# Patient Record
Sex: FEMALE | Race: WHITE | Employment: OTHER | URBAN - METROPOLITAN AREA
[De-identification: names, ages, dates, MRNs, and addresses within clinical notes are randomized per-mention and may not be internally consistent; named-entity substitution may affect disease eponyms.]

---

## 2017-01-04 RX ORDER — METOPROLOL TARTRATE 50 MG/1
TABLET, FILM COATED ORAL
Qty: 180 TABLET | Refills: 1 | Status: SHIPPED | OUTPATIENT
Start: 2017-01-04 | End: 2017-03-27

## 2017-01-04 NOTE — TELEPHONE ENCOUNTER
Current Outpatient Prescriptions:              Metoprolol Tartrate 50 MG Oral Tab Take 1 tablet by mouth  twice a day with meals.  Disp: 180 tablet Rfl: 1

## 2017-02-23 ENCOUNTER — TELEPHONE (OUTPATIENT)
Dept: NEPHROLOGY | Facility: CLINIC | Age: 77
End: 2017-02-23

## 2017-02-23 ENCOUNTER — OFFICE VISIT (OUTPATIENT)
Dept: GASTROENTEROLOGY | Facility: CLINIC | Age: 77
End: 2017-02-23

## 2017-02-23 ENCOUNTER — TELEPHONE (OUTPATIENT)
Dept: GASTROENTEROLOGY | Facility: CLINIC | Age: 77
End: 2017-02-23

## 2017-02-23 VITALS
DIASTOLIC BLOOD PRESSURE: 72 MMHG | WEIGHT: 94.81 LBS | HEART RATE: 61 BPM | SYSTOLIC BLOOD PRESSURE: 172 MMHG | BODY MASS INDEX: 15.8 KG/M2 | HEIGHT: 65 IN

## 2017-02-23 DIAGNOSIS — Z01.89 ROUTINE LAB DRAW: Primary | ICD-10-CM

## 2017-02-23 DIAGNOSIS — E55.9 VITAMIN D DEFICIENCY: ICD-10-CM

## 2017-02-23 DIAGNOSIS — Z86.010 HISTORY OF COLON POLYPS: Primary | ICD-10-CM

## 2017-02-23 PROCEDURE — G0463 HOSPITAL OUTPT CLINIC VISIT: HCPCS | Performed by: INTERNAL MEDICINE

## 2017-02-23 PROCEDURE — 99213 OFFICE O/P EST LOW 20 MIN: CPT | Performed by: INTERNAL MEDICINE

## 2017-02-23 NOTE — TELEPHONE ENCOUNTER
Scheduled for:  Colonoscopy 38540  Date: Wed 3/29/17  Location: [facility]     Sedation:  IV  Time:  8:30 am  Prep: miralax  Meds/Allergies Reconciled?:  NKA  Diagnosis with codes:  Hx od colon polyps Z86.010  Was patient informed to call insurance with codes (Y/

## 2017-02-24 NOTE — TELEPHONE ENCOUNTER
Lab orders entered. Patient contacted and informed to fast 12 hours for lab work and she can do them a few days prior to her appointment with Dr. Luis Felipe Andres.

## 2017-02-24 NOTE — PROGRESS NOTES
HPI:    Patient ID: Criss Cee is a 67year old female. HPI  The patient returns in follow-up today along with her . She was last seen in March 2014. As per previous notes she underwent a screening colonoscopy in October 2013.   She was foun (six) hours as needed for Pain. Disp:  Rfl:    vitamin B-12 (CYANOCOBALAMIN) 50 MCG Oral Tab Take 500 mcg by mouth daily. Disp:  Rfl:    folic acid (FOLVITE) 237 MCG Oral Tab Take 400 mcg by mouth daily.  Disp:  Rfl:    Lactobacillus Rhamnosus, GG, ([facility] P Latest Ref Rng 8/15/2016   Glucose      70 - 99 mg/dL 93   Sodium      136 - 144 mmol/L 136   Potassium      3.3 - 5.1 mmol/L 4.1   Chloride      95 - 110 mmol/L 101   Carbon Dioxide, Total      22 - 32 mmol/L 28   BUN      8 - 20 mg/dL 12   CREATININE recommend at least 1 follow-up full colonoscopy at this point to exclude metachronous, advanced colon polyps. Rationale, nature and risks were discussed.   This will be arranged with a Gatorade/MiraLAX preparation which the patient tolerated well previousl

## 2017-03-06 ENCOUNTER — PRIOR ORIGINAL RECORDS (OUTPATIENT)
Dept: OTHER | Age: 77
End: 2017-03-06

## 2017-03-06 ENCOUNTER — LAB ENCOUNTER (OUTPATIENT)
Dept: LAB | Age: 77
End: 2017-03-06
Attending: INTERNAL MEDICINE
Payer: MEDICARE

## 2017-03-06 DIAGNOSIS — E55.9 VITAMIN D DEFICIENCY: ICD-10-CM

## 2017-03-06 DIAGNOSIS — Z01.89 ROUTINE LAB DRAW: ICD-10-CM

## 2017-03-06 LAB
ALBUMIN SERPL BCP-MCNC: 3.4 G/DL (ref 3.5–4.8)
ALBUMIN/GLOB SERPL: 0.8 {RATIO} (ref 1–2)
ALP SERPL-CCNC: 125 U/L (ref 32–100)
ALT SERPL-CCNC: 11 U/L (ref 14–54)
ANION GAP SERPL CALC-SCNC: 9 MMOL/L (ref 0–18)
AST SERPL-CCNC: 28 U/L (ref 15–41)
BASOPHILS # BLD: 0 K/UL (ref 0–0.2)
BASOPHILS NFR BLD: 1 %
BILIRUB SERPL-MCNC: 0.9 MG/DL (ref 0.3–1.2)
BILIRUB UR QL: NEGATIVE
BUN SERPL-MCNC: 10 MG/DL (ref 8–20)
BUN/CREAT SERPL: 14.3 (ref 10–20)
C3 SERPL-MCNC: 85 MG/DL (ref 88–201)
C4 SERPL-MCNC: 12 MG/DL (ref 18–55)
CALCIUM SERPL-MCNC: 9.2 MG/DL (ref 8.5–10.5)
CHLORIDE SERPL-SCNC: 97 MMOL/L (ref 95–110)
CHOLEST SERPL-MCNC: 188 MG/DL (ref 110–200)
CLARITY UR: CLEAR
CO2 SERPL-SCNC: 28 MMOL/L (ref 22–32)
COLOR UR: YELLOW
CREAT SERPL-MCNC: 0.7 MG/DL (ref 0.5–1.5)
CRP SERPL-MCNC: 0.6 MG/DL (ref 0–0.9)
EOSINOPHIL # BLD: 0.1 K/UL (ref 0–0.7)
EOSINOPHIL NFR BLD: 2 %
ERYTHROCYTE [DISTWIDTH] IN BLOOD BY AUTOMATED COUNT: 14.8 % (ref 11–15)
GLOBULIN PLAS-MCNC: 4.3 G/DL (ref 2.5–3.7)
GLUCOSE SERPL-MCNC: 87 MG/DL (ref 70–99)
GLUCOSE UR-MCNC: NEGATIVE MG/DL
HCT VFR BLD AUTO: 39.4 % (ref 35–48)
HDLC SERPL-MCNC: 85 MG/DL
HGB BLD-MCNC: 13.1 G/DL (ref 12–16)
HGB UR QL STRIP.AUTO: NEGATIVE
IRON SATN MFR SERPL: 16 % (ref 15–50)
IRON SERPL-MCNC: 54 MCG/DL (ref 28–170)
KETONES UR-MCNC: NEGATIVE MG/DL
LDLC SERPL CALC-MCNC: 87 MG/DL (ref 0–99)
LEUKOCYTE ESTERASE UR QL STRIP.AUTO: NEGATIVE
LYMPHOCYTES # BLD: 1.3 K/UL (ref 1–4)
LYMPHOCYTES NFR BLD: 45 %
MCH RBC QN AUTO: 28.3 PG (ref 27–32)
MCHC RBC AUTO-ENTMCNC: 33.3 G/DL (ref 32–37)
MCV RBC AUTO: 85 FL (ref 80–100)
MONOCYTES # BLD: 0.3 K/UL (ref 0–1)
MONOCYTES NFR BLD: 12 %
NEUTROPHILS # BLD AUTO: 1.1 K/UL (ref 1.8–7.7)
NEUTROPHILS NFR BLD: 41 %
NITRITE UR QL STRIP.AUTO: NEGATIVE
NONHDLC SERPL-MCNC: 103 MG/DL
OSMOLALITY UR CALC.SUM OF ELEC: 276 MOSM/KG (ref 275–295)
PH UR: 6 [PH] (ref 5–8)
PLATELET # BLD AUTO: 117 K/UL (ref 140–400)
PMV BLD AUTO: 8.3 FL (ref 7.4–10.3)
POTASSIUM SERPL-SCNC: 4.2 MMOL/L (ref 3.3–5.1)
PROT SERPL-MCNC: 7.7 G/DL (ref 5.9–8.4)
PROT UR-MCNC: NEGATIVE MG/DL
RBC # BLD AUTO: 4.63 M/UL (ref 3.7–5.4)
SODIUM SERPL-SCNC: 134 MMOL/L (ref 136–144)
SP GR UR STRIP: 1.01 (ref 1–1.03)
TIBC SERPL-MCNC: 339 MCG/DL (ref 228–428)
TRANSFERRIN SERPL-MCNC: 257 MG/DL (ref 192–382)
TRIGL SERPL-MCNC: 80 MG/DL (ref 1–149)
TSH SERPL-ACNC: 4.72 UIU/ML (ref 0.34–5.6)
UROBILINOGEN UR STRIP-ACNC: <2
VIT C UR-MCNC: NEGATIVE MG/DL
WBC # BLD AUTO: 2.8 K/UL (ref 4–11)

## 2017-03-06 PROCEDURE — 86160 COMPLEMENT ANTIGEN: CPT

## 2017-03-06 PROCEDURE — 81003 URINALYSIS AUTO W/O SCOPE: CPT

## 2017-03-06 PROCEDURE — 80053 COMPREHEN METABOLIC PANEL: CPT

## 2017-03-06 PROCEDURE — 83540 ASSAY OF IRON: CPT

## 2017-03-06 PROCEDURE — 84443 ASSAY THYROID STIM HORMONE: CPT

## 2017-03-06 PROCEDURE — 82306 VITAMIN D 25 HYDROXY: CPT

## 2017-03-06 PROCEDURE — 36415 COLL VENOUS BLD VENIPUNCTURE: CPT

## 2017-03-06 PROCEDURE — 86140 C-REACTIVE PROTEIN: CPT

## 2017-03-06 PROCEDURE — 84466 ASSAY OF TRANSFERRIN: CPT

## 2017-03-06 PROCEDURE — 80061 LIPID PANEL: CPT

## 2017-03-06 PROCEDURE — 85025 COMPLETE CBC W/AUTO DIFF WBC: CPT

## 2017-03-07 ENCOUNTER — OFFICE VISIT (OUTPATIENT)
Dept: RHEUMATOLOGY | Facility: CLINIC | Age: 77
End: 2017-03-07

## 2017-03-07 VITALS
SYSTOLIC BLOOD PRESSURE: 185 MMHG | HEART RATE: 59 BPM | WEIGHT: 93.81 LBS | HEIGHT: 65 IN | DIASTOLIC BLOOD PRESSURE: 82 MMHG | BODY MASS INDEX: 15.63 KG/M2

## 2017-03-07 DIAGNOSIS — M06.09 SERONEGATIVE RHEUMATOID ARTHRITIS OF MULTIPLE SITES (HCC): ICD-10-CM

## 2017-03-07 DIAGNOSIS — M35.01 SJOGREN'S SYNDROME WITH KERATOCONJUNCTIVITIS SICCA (HCC): Primary | ICD-10-CM

## 2017-03-07 PROCEDURE — 99213 OFFICE O/P EST LOW 20 MIN: CPT | Performed by: INTERNAL MEDICINE

## 2017-03-07 PROCEDURE — G0463 HOSPITAL OUTPT CLINIC VISIT: HCPCS | Performed by: INTERNAL MEDICINE

## 2017-03-07 NOTE — PROGRESS NOTES
HPI:    Patient ID: Kavon Huerta is a 67year old female. HPI Comments: Chikis Marquez is a 67year old woman with autoimmune disorder. She continues to feel that she's been doing well.  She had severe joint destruction in the past, but denies increased swellin acetaminophen (TYLENOL) 325 MG Oral Tab Take 325 mg by mouth every 6 (six) hours as needed for Pain. Disp:  Rfl:    vitamin B-12 (CYANOCOBALAMIN) 50 MCG Oral Tab Take 500 mcg by mouth daily.    Disp:  Rfl:    folic acid (FOLVITE) 141 MCG Oral Tab Take 400 elevated C-reactive protein, polyclonal gammopathy. All is stable. Her leukopenia and thrombocytopenia will be followed closely. There isn't an indication for immunosuppressives at this point. Labs will be done again in 6 months and include an SPEP.  I will

## 2017-03-08 LAB — 25(OH)D3 SERPL-MCNC: 48.9 NG/ML

## 2017-03-09 LAB
ALBUMIN: 3.4 G/DL
ALT (SGPT): 11 U/L
ALT (SGPT): 11 U/L
AST (SGOT): 28 U/L
AST (SGOT): 28 U/L
BILIRUBIN TOTAL: 0.9 MG/DL
BILIRUBIN TOTAL: 0.9 MG/DL
BUN: 10 MG/DL
CALCIUM: 9.2 MG/DL
CHLORIDE: 97 MEQ/L
CHOLESTEROL, TOTAL: 188 MG/DL
CHOLESTEROL, TOTAL: 188 MG/DL
CREATININE, SERUM: 0.7 MG/DL
GLOBULIN: 4.3 G/DL
GLUCOSE: 87 MG/DL
HDL CHOLESTEROL: 85 MG/DL
HDL CHOLESTEROL: 85 MG/DL
HEMATOCRIT: 39.4 %
HEMOGLOBIN: 13.1 G/DL
LDL CHOLESTEROL: 87 MG/DL
LDL CHOLESTEROL: 87 MG/DL
MCH: 28.3 PG
MCHC: 33.3 G/DL
MCV: 85 FL
NON-HDL CHOLESTEROL: 103 MG/DL
NON-HDL CHOLESTEROL: 103 MG/DL
PLATELETS: 117 K/UL
POTASSIUM, SERUM: 4.2 MEQ/L
PROTEIN, TOTAL: 7.7 G/DL
RED BLOOD COUNT: 4.63 X 10-6/U
SGOT (AST): 28 IU/L
SGPT (ALT): 11 IU/L
SODIUM: 134 MEQ/L
THYROID STIMULATING HORMONE: 4.72 MLU/L
TRIGLYCERIDES: 80 MG/DL
TRIGLYCERIDES: 80 MG/DL
WHITE BLOOD COUNT: 2.8 X 10-3/U

## 2017-03-14 ENCOUNTER — PRIOR ORIGINAL RECORDS (OUTPATIENT)
Dept: OTHER | Age: 77
End: 2017-03-14

## 2017-03-20 ENCOUNTER — OFFICE VISIT (OUTPATIENT)
Dept: NEPHROLOGY | Facility: CLINIC | Age: 77
End: 2017-03-20

## 2017-03-20 VITALS
BODY MASS INDEX: 15.85 KG/M2 | DIASTOLIC BLOOD PRESSURE: 74 MMHG | SYSTOLIC BLOOD PRESSURE: 193 MMHG | WEIGHT: 94 LBS | HEART RATE: 66 BPM | HEIGHT: 64.5 IN

## 2017-03-20 DIAGNOSIS — I10 ESSENTIAL HYPERTENSION: ICD-10-CM

## 2017-03-20 DIAGNOSIS — I01.1 RHEUMATOID AORTITIS: Primary | ICD-10-CM

## 2017-03-20 DIAGNOSIS — E78.9 DISORDER OF LIPID METABOLISM: ICD-10-CM

## 2017-03-20 PROCEDURE — G0463 HOSPITAL OUTPT CLINIC VISIT: HCPCS | Performed by: INTERNAL MEDICINE

## 2017-03-20 PROCEDURE — 99214 OFFICE O/P EST MOD 30 MIN: CPT | Performed by: INTERNAL MEDICINE

## 2017-03-20 NOTE — PATIENT INSTRUCTIONS
1. Janet Crew   good job with everything     good luck with colonoscopy     see me September after labs done. Arielle Mtz

## 2017-03-24 NOTE — PROGRESS NOTES
NEPHROLOGY PROGRESS NOTE  Ani Lau     MRN:  [id number]   Date of Service:  3/20/17     HISTORY OF PRESENT ILLNESS: The patient is here. She is doing fine. She has rheumatoid arthritis, which she manages well at home. She has no specific complaints.  Pre about 6 months.      Dictated portions dictated on 3/23/17

## 2017-03-27 RX ORDER — HYDROXYCHLOROQUINE SULFATE 200 MG/1
200 TABLET, FILM COATED ORAL 2 TIMES DAILY
Qty: 180 TABLET | Refills: 3 | Status: SHIPPED | OUTPATIENT
Start: 2017-03-27 | End: 2018-01-11

## 2017-03-27 RX ORDER — METOPROLOL TARTRATE 50 MG/1
TABLET, FILM COATED ORAL
Qty: 180 TABLET | Refills: 1 | Status: ON HOLD | OUTPATIENT
Start: 2017-03-27 | End: 2017-05-19

## 2017-03-27 NOTE — TELEPHONE ENCOUNTER
From: Romy Fan  To: Verna Badillo MD  Sent: 3/26/2017 7:06 AM CDT  Subject: Medication Renewal Request    Original authorizing provider: Michael Torrez.  Rochelle Lara would like a refill of the following medications:  Metoprolol Tartrate

## 2017-03-27 NOTE — TELEPHONE ENCOUNTER
From: Samir Hardy  To: Molly Cooley MD  Sent: 3/26/2017 7:06 AM CDT  Subject: Medication Renewal Request    Original authorizing provider: MD Samir Thurman would like a refill of the following medications:  Hydroxychloroqui

## 2017-03-29 ENCOUNTER — SURGERY (OUTPATIENT)
Age: 77
End: 2017-03-29

## 2017-03-29 ENCOUNTER — HOSPITAL ENCOUNTER (OUTPATIENT)
Facility: HOSPITAL | Age: 77
Setting detail: HOSPITAL OUTPATIENT SURGERY
Discharge: HOME | End: 2017-03-29
Attending: INTERNAL MEDICINE | Admitting: INTERNAL MEDICINE
Payer: MEDICARE

## 2017-03-29 VITALS
HEART RATE: 65 BPM | RESPIRATION RATE: 19 BRPM | SYSTOLIC BLOOD PRESSURE: 153 MMHG | OXYGEN SATURATION: 96 % | BODY MASS INDEX: 16.05 KG/M2 | WEIGHT: 94 LBS | HEIGHT: 64 IN | DIASTOLIC BLOOD PRESSURE: 86 MMHG

## 2017-03-29 PROCEDURE — 45385 COLONOSCOPY W/LESION REMOVAL: CPT | Performed by: INTERNAL MEDICINE

## 2017-03-29 PROCEDURE — 0DBK8ZX EXCISION OF ASCENDING COLON, VIA NATURAL OR ARTIFICIAL OPENING ENDOSCOPIC, DIAGNOSTIC: ICD-10-PCS | Performed by: INTERNAL MEDICINE

## 2017-03-29 PROCEDURE — 45381 COLONOSCOPY SUBMUCOUS NJX: CPT | Performed by: INTERNAL MEDICINE

## 2017-03-29 PROCEDURE — 99153 MOD SED SAME PHYS/QHP EA: CPT | Performed by: INTERNAL MEDICINE

## 2017-03-29 PROCEDURE — 0DBL8ZX EXCISION OF TRANSVERSE COLON, VIA NATURAL OR ARTIFICIAL OPENING ENDOSCOPIC, DIAGNOSTIC: ICD-10-PCS | Performed by: INTERNAL MEDICINE

## 2017-03-29 PROCEDURE — 99152 MOD SED SAME PHYS/QHP 5/>YRS: CPT | Performed by: INTERNAL MEDICINE

## 2017-03-29 PROCEDURE — 3E0H8GC INTRODUCTION OF OTHER THERAPEUTIC SUBSTANCE INTO LOWER GI, VIA NATURAL OR ARTIFICIAL OPENING ENDOSCOPIC: ICD-10-PCS | Performed by: INTERNAL MEDICINE

## 2017-03-29 RX ORDER — SODIUM CHLORIDE 0.9 % (FLUSH) 0.9 %
10 SYRINGE (ML) INJECTION AS NEEDED
Status: DISCONTINUED | OUTPATIENT
Start: 2017-03-29 | End: 2017-03-29

## 2017-03-29 RX ORDER — SODIUM CHLORIDE, SODIUM LACTATE, POTASSIUM CHLORIDE, CALCIUM CHLORIDE 600; 310; 30; 20 MG/100ML; MG/100ML; MG/100ML; MG/100ML
INJECTION, SOLUTION INTRAVENOUS CONTINUOUS
Status: DISCONTINUED | OUTPATIENT
Start: 2017-03-29 | End: 2017-03-29

## 2017-03-29 RX ORDER — MIDAZOLAM HYDROCHLORIDE 1 MG/ML
INJECTION INTRAMUSCULAR; INTRAVENOUS
Status: DISCONTINUED | OUTPATIENT
Start: 2017-03-29 | End: 2017-03-29

## 2017-03-29 RX ORDER — MIDAZOLAM HYDROCHLORIDE 1 MG/ML
1 INJECTION INTRAMUSCULAR; INTRAVENOUS EVERY 5 MIN PRN
Status: DISCONTINUED | OUTPATIENT
Start: 2017-03-29 | End: 2017-03-29

## 2017-03-29 NOTE — H&P
History & Physical Examination    Patient Name: Mike Hall  MRN: [id number]  [facility]: [id number]  YOB: 1940    Diagnosis: Personal history of adenomatous colon polyps        Prescriptions prior to admission:  Metoprolol Tartrate 50 MG Oral Ta Allergies: No Known Allergies    Past Medical History   Diagnosis Date   • Rheumatoid arthritis ([facility] [address]    • Unspecified essential hypertension    • Dental examination 1998 11/00   • Encounter for eye exam    • Blood in stool 1995   • Periodic heal

## 2017-03-29 NOTE — OPERATIVE REPORT
[facility] Endoscopy Report      Date of Procedure:  03/29/2017      Preoperative Diagnosis:  Personal history of adenomatous colon polyps      Postoperative Diagnosis:  1. Multiple colon polyps  2.   Sigmoid colon diverticulosis      Proce polyp to elevated off the bowel wall. The polyp was excised using snare cautery in a piecemeal fashion with fragments retrieved via suction. #5 Resolution clips were utilized to close the corresponding mucosal defect.   A carbon-based tattoo was applied p

## 2017-05-15 ENCOUNTER — HOSPITAL ENCOUNTER (INPATIENT)
Facility: HOSPITAL | Age: 77
LOS: 4 days | Discharge: HOME | DRG: 274 | End: 2017-05-19
Admitting: HOSPITALIST
Payer: MEDICARE

## 2017-05-15 ENCOUNTER — APPOINTMENT (OUTPATIENT)
Dept: GENERAL RADIOLOGY | Facility: HOSPITAL | Age: 77
DRG: 274 | End: 2017-05-15
Payer: MEDICARE

## 2017-05-15 ENCOUNTER — TELEPHONE (OUTPATIENT)
Dept: NEPHROLOGY | Facility: CLINIC | Age: 77
End: 2017-05-15

## 2017-05-15 ENCOUNTER — APPOINTMENT (OUTPATIENT)
Dept: GENERAL RADIOLOGY | Facility: HOSPITAL | Age: 77
DRG: 274 | End: 2017-05-15
Attending: EMERGENCY MEDICINE
Payer: MEDICARE

## 2017-05-15 DIAGNOSIS — S70.02XA HEMATOMA OF HIP, LEFT, INITIAL ENCOUNTER: ICD-10-CM

## 2017-05-15 DIAGNOSIS — I49.9 CARDIAC ARRHYTHMIA, UNSPECIFIED CARDIAC ARRHYTHMIA TYPE: Primary | ICD-10-CM

## 2017-05-15 PROCEDURE — 73552 X-RAY EXAM OF FEMUR 2/>: CPT | Performed by: EMERGENCY MEDICINE

## 2017-05-15 PROCEDURE — 72170 X-RAY EXAM OF PELVIS: CPT | Performed by: EMERGENCY MEDICINE

## 2017-05-15 PROCEDURE — 99220 INITIAL OBSERVATION CARE,LEVL III: CPT | Performed by: HOSPITALIST

## 2017-05-15 PROCEDURE — 73502 X-RAY EXAM HIP UNI 2-3 VIEWS: CPT

## 2017-05-15 RX ORDER — ACETAMINOPHEN 325 MG/1
325 TABLET ORAL EVERY 6 HOURS PRN
Status: DISCONTINUED | OUTPATIENT
Start: 2017-05-15 | End: 2017-05-15

## 2017-05-15 RX ORDER — MORPHINE SULFATE 2 MG/ML
2 INJECTION, SOLUTION INTRAMUSCULAR; INTRAVENOUS EVERY 2 HOUR PRN
Status: DISCONTINUED | OUTPATIENT
Start: 2017-05-15 | End: 2017-05-19

## 2017-05-15 RX ORDER — MELATONIN
400 DAILY
Status: DISCONTINUED | OUTPATIENT
Start: 2017-05-15 | End: 2017-05-19

## 2017-05-15 RX ORDER — MORPHINE SULFATE 2 MG/ML
1 INJECTION, SOLUTION INTRAMUSCULAR; INTRAVENOUS EVERY 2 HOUR PRN
Status: DISCONTINUED | OUTPATIENT
Start: 2017-05-15 | End: 2017-05-19

## 2017-05-15 RX ORDER — METOPROLOL TARTRATE 50 MG/1
50 TABLET, FILM COATED ORAL
Status: DISCONTINUED | OUTPATIENT
Start: 2017-05-15 | End: 2017-05-15

## 2017-05-15 RX ORDER — ONDANSETRON 2 MG/ML
4 INJECTION INTRAMUSCULAR; INTRAVENOUS EVERY 6 HOURS PRN
Status: DISCONTINUED | OUTPATIENT
Start: 2017-05-15 | End: 2017-05-19

## 2017-05-15 RX ORDER — OMEGA-3/DHA/EPA/FISH OIL 60 MG-90MG
1 CAPSULE ORAL
Status: ON HOLD | COMMUNITY
End: 2017-05-19

## 2017-05-15 RX ORDER — HYOSCYAMINE SULFATE 0.125 MG
0.12 TABLET,DISINTEGRATING ORAL EVERY 6 HOURS PRN
Status: DISCONTINUED | OUTPATIENT
Start: 2017-05-15 | End: 2017-05-19

## 2017-05-15 RX ORDER — DOCUSATE SODIUM 100 MG/1
100 CAPSULE, LIQUID FILLED ORAL 2 TIMES DAILY
Status: DISCONTINUED | OUTPATIENT
Start: 2017-05-15 | End: 2017-05-19

## 2017-05-15 RX ORDER — SODIUM PHOSPHATE, DIBASIC AND SODIUM PHOSPHATE, MONOBASIC 7; 19 G/133ML; G/133ML
1 ENEMA RECTAL ONCE AS NEEDED
Status: DISCONTINUED | OUTPATIENT
Start: 2017-05-15 | End: 2017-05-19

## 2017-05-15 RX ORDER — HEPARIN SODIUM 5000 [USP'U]/ML
5000 INJECTION, SOLUTION INTRAVENOUS; SUBCUTANEOUS
Status: COMPLETED | OUTPATIENT
Start: 2017-05-15 | End: 2017-05-15

## 2017-05-15 RX ORDER — HEPARIN SODIUM 5000 [USP'U]/ML
5000 INJECTION, SOLUTION INTRAVENOUS; SUBCUTANEOUS EVERY 12 HOURS
Status: DISCONTINUED | OUTPATIENT
Start: 2017-05-15 | End: 2017-05-15

## 2017-05-15 RX ORDER — POLYETHYLENE GLYCOL 3350 17 G/17G
17 POWDER, FOR SOLUTION ORAL DAILY PRN
Status: DISCONTINUED | OUTPATIENT
Start: 2017-05-15 | End: 2017-05-19

## 2017-05-15 RX ORDER — BISACODYL 10 MG
10 SUPPOSITORY, RECTAL RECTAL
Status: DISCONTINUED | OUTPATIENT
Start: 2017-05-15 | End: 2017-05-19

## 2017-05-15 RX ORDER — HYDROXYCHLOROQUINE SULFATE 200 MG/1
200 TABLET, FILM COATED ORAL 2 TIMES DAILY
Status: DISCONTINUED | OUTPATIENT
Start: 2017-05-15 | End: 2017-05-19

## 2017-05-15 RX ORDER — MORPHINE SULFATE 2 MG/ML
2 INJECTION, SOLUTION INTRAMUSCULAR; INTRAVENOUS ONCE
Status: DISCONTINUED | OUTPATIENT
Start: 2017-05-15 | End: 2017-05-19

## 2017-05-15 RX ORDER — MORPHINE SULFATE 2 MG/ML
2 INJECTION, SOLUTION INTRAMUSCULAR; INTRAVENOUS ONCE
Status: COMPLETED | OUTPATIENT
Start: 2017-05-15 | End: 2017-05-15

## 2017-05-15 RX ORDER — ACETAMINOPHEN 325 MG/1
650 TABLET ORAL EVERY 6 HOURS PRN
Status: DISCONTINUED | OUTPATIENT
Start: 2017-05-15 | End: 2017-05-19

## 2017-05-15 RX ORDER — MORPHINE SULFATE 4 MG/ML
4 INJECTION, SOLUTION INTRAMUSCULAR; INTRAVENOUS EVERY 2 HOUR PRN
Status: DISCONTINUED | OUTPATIENT
Start: 2017-05-15 | End: 2017-05-19

## 2017-05-15 RX ORDER — SACCHAROMYCES BOULARDII 250 MG
250 CAPSULE ORAL 2 TIMES DAILY WITH MEALS
Status: DISCONTINUED | OUTPATIENT
Start: 2017-05-15 | End: 2017-05-19

## 2017-05-15 RX ORDER — 0.9 % SODIUM CHLORIDE 0.9 %
VIAL (ML) INJECTION
Status: COMPLETED
Start: 2017-05-15 | End: 2017-05-15

## 2017-05-15 RX ORDER — DOXEPIN HYDROCHLORIDE 50 MG/1
1 CAPSULE ORAL
Status: DISCONTINUED | OUTPATIENT
Start: 2017-05-15 | End: 2017-05-19

## 2017-05-15 NOTE — HISTORICAL OFFICE NOTE
Thomas Laura  : 1940  ACCOUNT:  [id number]  [id number]  PCP: Dr. Ghulam Eduardo     TODAY'S DATE: 2017  DICTATED BY:  Tonya Gamez M.D.]    CHIEF COMPLAINT: [Followup of Atrial fibrillation, currently SR.]    HPI:  [On 2017, Emmanuelle Link, EYES: conjunctivae not injected and no xanthelasma. ENT: mucosa pink and moist. NECK: jugular venous pressure not elevated. RESP: respirations with normal rate and rhythm, clear to auscultation. GI: normal bowel sounds. MS: normal gait.  EXT: no clubbing or daily                                    09/19/12 Vitamin B-12          500MCG    1 daily                                  09/19/12 Vitamin C             500MG     1 daily                                  09/18/12 Hydroxychloroquine [id number]     1 twice da

## 2017-05-15 NOTE — ED PROVIDER NOTES
Patient Seen in: [facility] Emergency Department    History   Patient presents with:  Fall (musculoskeletal, neurologic)    Stated Complaint:     HPI    65year old female with a past medical history of osteoporosis, rheumatoid arthritis, breast c Metoprolol Tartrate 50 MG Oral Tab,  Take 1 tablet by mouth  twice a day with meals. Hydroxychloroquine Sulfate 200 MG Oral Tab,  Take 1 tablet (200 mg total) by mouth 2 (two) times daily.    Cholecalciferol (VITAMIN D3) 2000 UNITS Oral Tab,  Take 2,000 Current:/87 mmHg  Pulse 122  Temp(Src) 98 °F (36.7 °C)  Resp 18  Ht 165.1 cm (5' 5\")  Wt 42.094 kg  BMI 15.44 kg/m2  SpO2 100%        Physical Exam   Constitutional: She is oriented to person, place, and time. She appears well-developed.  She appears The following orders were created for panel order CBC WITH DIFFERENTIAL WITH PLATELET.   Procedure                               Abnormality         Status                     ---------                               -----------         ------ Present on Admission  Date Reviewed: 3/23/2017          ICD-10-CM Noted POA    * (Principal)Cardiac arrhythmia, unspecified cardiac arrhythmia type I49.9 5/15/2017 Unknown    Cardiac arrhythmia I49.9 5/15/2017 Unknown    Hematoma of hip, left, initial enco

## 2017-05-15 NOTE — TELEPHONE ENCOUNTER
Spoke to patient's daughter Walker Thomas and I was informed that patient is in the ER and possibly has a broken hip. They are taking additional xrays right now. [facility] aware.

## 2017-05-15 NOTE — ED INITIAL ASSESSMENT (HPI)
Patient states she fell x3 days ago, complains of L hip pain, states it hurts to ambulate, patient denies hitting head/loc/chest pain/tab

## 2017-05-15 NOTE — H&P
[facility]    PATIENT'S NAME: Elda Reji   ATTENDING PHYSICIAN: Senia Landa MD   PATIENT ACCOUNT#:   [id number]    LOCATION:  [place] [address]  MEDICAL RECORD #:   [id number]       YOB: 1940  ADMISSION DATE:       05/15/20 to stand up, but she has been having difficulty with her gait. Also, she noted ecchymosis and bruise on the lateral aspect of the left proximal thigh. Other 12-point review of systems negative.       PHYSICAL EXAMINATION:    GENERAL:  Alert and oriented t

## 2017-05-15 NOTE — CONSULTS
[facility] HOSP - [facility]    Report of Cardiology Consultation    Verba Law Patient Status:  Emergency    3/21/1940 MRN [id number]   Location [address] Attending Kristel Acosta MD   [facility] Day # 0  Brooklynn Villa, Past Surgical History      Past Surgical History    FOOT SURGERY      CHOLECYSTECTOMY  2012    ELECTROCARDIOGRAM, COMPLETE  08-    Comment SCANNED TO MEDIA TAB    MASTECTOMY RIGHT  2009    COLONOSCOPY  2013    COLONOSCOPY N/A 3/29/2017    Com CREATSERUM 0.61 05/15/2017   BUN 14 05/15/2017   * 05/15/2017   K 4.0 05/15/2017   CL 96 05/15/2017   CO2 22 05/15/2017   * 05/15/2017   CA 8.8 05/15/2017   ALB 3.4* 03/06/2017   ALKPHO 125* 03/06/2017   TP 7.7 03/06/2017   AST 28 03/06/2017

## 2017-05-16 ENCOUNTER — APPOINTMENT (OUTPATIENT)
Dept: CV DIAGNOSTICS | Facility: HOSPITAL | Age: 77
DRG: 274 | End: 2017-05-16
Attending: INTERNAL MEDICINE
Payer: MEDICARE

## 2017-05-16 ENCOUNTER — APPOINTMENT (OUTPATIENT)
Dept: GENERAL RADIOLOGY | Facility: HOSPITAL | Age: 77
DRG: 274 | End: 2017-05-16
Attending: HOSPITALIST
Payer: MEDICARE

## 2017-05-16 PROCEDURE — 99233 SBSQ HOSP IP/OBS HIGH 50: CPT | Performed by: HOSPITALIST

## 2017-05-16 PROCEDURE — 71010 XR CHEST AP PORTABLE  (CPT=71010): CPT | Performed by: HOSPITALIST

## 2017-05-16 PROCEDURE — B24BYZZ ULTRASONOGRAPHY OF HEART WITH AORTA USING OTHER CONTRAST: ICD-10-PCS | Performed by: INTERNAL MEDICINE

## 2017-05-16 PROCEDURE — 93306 TTE W/DOPPLER COMPLETE: CPT | Performed by: INTERNAL MEDICINE

## 2017-05-16 RX ORDER — METOPROLOL TARTRATE 50 MG/1
100 TABLET, FILM COATED ORAL
Status: DISCONTINUED | OUTPATIENT
Start: 2017-05-16 | End: 2017-05-19

## 2017-05-16 RX ORDER — MAGNESIUM OXIDE 400 MG (241.3 MG MAGNESIUM) TABLET
800 TABLET ONCE
Status: COMPLETED | OUTPATIENT
Start: 2017-05-16 | End: 2017-05-16

## 2017-05-16 NOTE — PHYSICAL THERAPY NOTE
PHYSICAL THERAPY QUICK EVALUATION - INPATIENT    Room Number: 969/376-M  Evaluation Date: 5/16/2017  Presenting Problem: cardiac arrhythmia, recent fall with hip pain  Physician Order: PT Eval and Treat    Problem List  Principal Problem:    Cardiac arrhyt functional limits     Lower extremity ROM is within functional limits    Lower extremity strength is within functional limits except for the following:  left hip 3/5, not resisted secondary to pain.     NEUROLOGICAL FINDINGS         héctor le intact PT, rn aware of above. Pt's family aware as well. PT Discharge Recommendations: Home    PLAN  Patient has been evaluated and presents with no skilled Physical Therapy needs at this time. Patient discharged from Physical Therapy services.   Please re-ord

## 2017-05-16 NOTE — PROGRESS NOTES
[facility]  Hospitalist Progress  Note     Lambert Proper Patient Status:  Inpatient    3/21/1940  65year old CSN [id number]   Location 706/855-H Attending Dulce Byrnes MD   Hosp Day # 1  Brooklynn Bruce MD     ASSESSMENT/PLAN HGB  14.3  12.0   HCT  42.6  35.3   MCV  84.1  83.1   MCH  28.2  28.3   MCHC  33.6  34.0   RDW  14.7  14.7   WBC  4.3  3.9*   PLT  134*  109*     Recent Labs   Lab  05/15/17   1436  05/16/17   0602   GLU  104*  90   BUN  14  8   CREATSERUM  0.61  0.54

## 2017-05-16 NOTE — PROGRESS NOTES
[facility] Cardiology   Progress Note    Taiwo Strickland Patient Status:  Observation    3/21/1940 MRN [id number]   Location [facility] Attending Osito Loza MD   Hosp Day # 1  Brooklynn Mary MD     1) P 250 mg Oral BID with meals     Pulmonary: clear to auscultation bilaterally, TRA diminished  Cardiovascular: S1, S2 normal, irregular  Abdominal: soft, non-tender  Extremities: no edema  Neurologic: Grossly normal  Tele: Aflutter with -130, 120-130 a deformity of indeterminate age. 3. Osteitis pubis. 4. Atherosclerosis. 5. Soft tissue swelling. 6. Postop changes in the abdomen and pelvis. Xr Hip W Or Wo Pelvis 2 Or 3 Views, Left (cpt=73502)    5/15/2017  CONCLUSION:  1. Demineralization.  2. L4 co

## 2017-05-16 NOTE — PLAN OF CARE
Impaired Activities of Daily Living    • Achieve highest/safest level of independence in self care Not Progressing        Impaired Functional Mobility    • Achieve highest/safest level of mobility/gait Not Progressing          DISCHARGE PLANNING    • Disch

## 2017-05-17 ENCOUNTER — APPOINTMENT (OUTPATIENT)
Dept: GENERAL RADIOLOGY | Facility: HOSPITAL | Age: 77
DRG: 274 | End: 2017-05-17
Attending: HOSPITALIST
Payer: MEDICARE

## 2017-05-17 PROCEDURE — 99233 SBSQ HOSP IP/OBS HIGH 50: CPT | Performed by: HOSPITALIST

## 2017-05-17 PROCEDURE — 71010 XR CHEST AP PORTABLE  (CPT=71010): CPT | Performed by: HOSPITALIST

## 2017-05-17 RX ORDER — MAGNESIUM OXIDE 400 MG (241.3 MG MAGNESIUM) TABLET
400 TABLET 2 TIMES DAILY
Status: DISCONTINUED | OUTPATIENT
Start: 2017-05-17 | End: 2017-05-19

## 2017-05-17 RX ORDER — MAGNESIUM OXIDE 400 MG (241.3 MG MAGNESIUM) TABLET
400 TABLET ONCE
Status: COMPLETED | OUTPATIENT
Start: 2017-05-17 | End: 2017-05-17

## 2017-05-17 NOTE — OCCUPATIONAL THERAPY NOTE
OCCUPATIONAL THERAPY EVALUATION - INPATIENT     Room Number: 416/675-Z  Evaluation Date: 5/17/2017  Type of Evaluation: Initial       Physician Order: IP Consult to Occupational Therapy  Reason for Therapy: ADL/IADL Dysfunction and Discharge Planning    OC w/ ADLS)       PLAN  OT Treatment Plan: ADL training;Functional transfer training; Endurance training;Patient/Family education;Patient/Family training (goals  5-)         OCCUPATIONAL THERAPY MEDICAL/SOCIAL HISTORY     Problem List  Principal Proble to make needs known    Behavioral/Emotional/Social: pleasant and cooperative    RANGE OF MOTION   Upper extremity ROM is within functional limits, Pt has significant joint deformities B hands, is L dominant    STRENGTH ASSESSMENT  Upper extremity strength Comment:    Patient will complete min assist  Comment:                Goals  on: 17  Frequency: 3-5x/week

## 2017-05-17 NOTE — DIETARY NOTE
ADULT NUTRITION INITIAL ASSESSMENT    Pt is at moderate nutrition risk. Pt meets malnutrition criteria.       CRITERIA FOR MALNUTRITION DIAGNOSIS:  Criteria for severe malnutrition diagnosis: chronic illness related to energy intake less than 75% for great Pt lives at home with . ADMITTING DIAGNOSIS: Cardiac arrhythmia    PERTINENT PAST MEDICAL HISTORY: RA, Breast CA-mastectomy, Choly, foot surgery, others noted.      ANTHROPOMETRICS:  HT: 165.1 cm (5' 5\")    WT: 40.96 kg (90 lb 4.8 oz)  BMI: Bod of supplement intake and tolerance of supplement intake.   - Anthropometric Measurement:      Monitor: wt  - Nutrition Goals:      halt wt loss, gradual wt gain as able, PO and supplement greater than 75% of needs, intake to meet needs and labs WNL    DIETI

## 2017-05-17 NOTE — CM/SW NOTE
Met with patient at bedside. Explanation of of BPCI/Medicare program provided. Patient was enrolled under . Patient/family agreed to phone f/u for 3 months from [facility] after discharge from [address] [place] [address]. BPCI/Medicare letter and brochure provided.

## 2017-05-17 NOTE — PROGRESS NOTES
[facility]    Progress Note    Macon Grad Patient Status:  Inpatient    3/21/1940 MRN [id number]   Location [facility] Attending Gary Zurita MD   Hosp Day # 2  Brooklynn Avila MD       Assessment and Plan: docusate sodium  100 mg Oral BID   • rivaroxaban  15 mg Oral Daily with food   • Cholecalciferol  2,000 Units Oral Daily   • multivitamin  1 tablet Oral Daily   • folic acid  719 mcg Oral Daily   • Hydroxychloroquine Sulfate  200 mg Oral BID   • ngocy atelectasis. Follow up study is recommended to exclude developing consolidation/pneumonia. 3. Call report         Xr Hip W Or Wo Pelvis 2 Or 3 Views, Left (cpt=73502)    5/15/2017  CONCLUSION:  1. Demineralization.  2. L4 compression deformity of indetermin

## 2017-05-18 ENCOUNTER — APPOINTMENT (OUTPATIENT)
Dept: CV DIAGNOSTICS | Facility: HOSPITAL | Age: 77
DRG: 274 | End: 2017-05-18
Attending: INTERNAL MEDICINE
Payer: MEDICARE

## 2017-05-18 ENCOUNTER — APPOINTMENT (OUTPATIENT)
Dept: INTERVENTIONAL RADIOLOGY/VASCULAR | Facility: HOSPITAL | Age: 77
DRG: 274 | End: 2017-05-18
Attending: INTERNAL MEDICINE
Payer: MEDICARE

## 2017-05-18 PROCEDURE — 4A023FZ MEASUREMENT OF CARDIAC RHYTHM, PERCUTANEOUS APPROACH: ICD-10-PCS | Performed by: INTERNAL MEDICINE

## 2017-05-18 PROCEDURE — 4A0234Z MEASUREMENT OF CARDIAC ELECTRICAL ACTIVITY, PERCUTANEOUS APPROACH: ICD-10-PCS | Performed by: INTERNAL MEDICINE

## 2017-05-18 PROCEDURE — 93325 DOPPLER ECHO COLOR FLOW MAPG: CPT | Performed by: INTERNAL MEDICINE

## 2017-05-18 PROCEDURE — 93320 DOPPLER ECHO COMPLETE: CPT | Performed by: INTERNAL MEDICINE

## 2017-05-18 PROCEDURE — 99233 SBSQ HOSP IP/OBS HIGH 50: CPT | Performed by: HOSPITALIST

## 2017-05-18 PROCEDURE — 02K83ZZ MAP CONDUCTION MECHANISM, PERCUTANEOUS APPROACH: ICD-10-PCS | Performed by: INTERNAL MEDICINE

## 2017-05-18 RX ORDER — TRAMADOL HYDROCHLORIDE 50 MG/1
100 TABLET ORAL EVERY 6 HOURS PRN
Status: DISCONTINUED | OUTPATIENT
Start: 2017-05-18 | End: 2017-05-19

## 2017-05-18 RX ORDER — SODIUM CHLORIDE 9 MG/ML
INJECTION, SOLUTION INTRAVENOUS
Status: DISPENSED
Start: 2017-05-18 | End: 2017-05-18

## 2017-05-18 RX ORDER — TRAMADOL HYDROCHLORIDE 50 MG/1
50 TABLET ORAL EVERY 6 HOURS PRN
Status: DISCONTINUED | OUTPATIENT
Start: 2017-05-18 | End: 2017-05-19

## 2017-05-18 RX ORDER — MIDAZOLAM HYDROCHLORIDE 1 MG/ML
INJECTION INTRAMUSCULAR; INTRAVENOUS
Status: DISPENSED
Start: 2017-05-18 | End: 2017-05-18

## 2017-05-18 RX ORDER — LIDOCAINE HYDROCHLORIDE 20 MG/ML
INJECTION, SOLUTION EPIDURAL; INFILTRATION; INTRACAUDAL; PERINEURAL
Status: COMPLETED
Start: 2017-05-18 | End: 2017-05-18

## 2017-05-18 RX ORDER — MIDAZOLAM HYDROCHLORIDE 1 MG/ML
INJECTION INTRAMUSCULAR; INTRAVENOUS
Status: COMPLETED
Start: 2017-05-18 | End: 2017-05-18

## 2017-05-18 RX ORDER — SODIUM CHLORIDE 0.9 % (FLUSH) 0.9 %
10 SYRINGE (ML) INJECTION AS NEEDED
Status: DISCONTINUED | OUTPATIENT
Start: 2017-05-18 | End: 2017-05-19

## 2017-05-18 RX ORDER — FLECAINIDE ACETATE 100 MG/1
200 TABLET ORAL ONCE
Status: COMPLETED | OUTPATIENT
Start: 2017-05-18 | End: 2017-05-18

## 2017-05-18 RX ORDER — ACETAMINOPHEN 325 MG/1
650 TABLET ORAL EVERY 4 HOURS PRN
Status: DISCONTINUED | OUTPATIENT
Start: 2017-05-18 | End: 2017-05-19

## 2017-05-18 RX ORDER — FLECAINIDE ACETATE 100 MG/1
100 TABLET ORAL EVERY 12 HOURS SCHEDULED
Status: DISCONTINUED | OUTPATIENT
Start: 2017-05-18 | End: 2017-05-19

## 2017-05-18 NOTE — PROCEDURES
Procedures performed:  1. Comprehensive EP study with 3-D mapping using Ensite Array. 2. CS recording and pacing. 3. RFA of Atrial Flutter    : Urvashi Bruno MD    Indication: Persistent typical atrial flutter. Anticoagulated preoperatively.   Moder CS but signal loss after pacing precluded ruling out short PPI. Mapping and ablation: Using irrigated ablation catheter which was placed in the six o'clock position of the cavo-tricuspid isthmus. Catheter positions and RF lesions were also recorded with

## 2017-05-18 NOTE — PROGRESS NOTES
[facility]  Hospitalist Progress  Note     Pinky Gastelum Patient Status:  Inpatient    3/21/1940  65year old [facility] [id number]   Location 999/663-Y Attending Kevin Ace MD   Hosp Day # 2  Brooklynn Wells MD     ASSESSMENT/PLAN induration or fluctuance. Skin: Skin is warm and dry. No rashes, erythema, diaphoresis. Psych: Normal mood and affect.  Behavior and judgment normal.     Labs:  Recent Labs   Lab  05/15/17   1436  05/16/17   0602  05/17/17   0537   RBC  5.07  4.25  4.25

## 2017-05-18 NOTE — PROGRESS NOTES
Pod# 0 s/p atrial flutter ablation.  EF 75%  MARCELINO no clot, MR moderate  AFl mapped to LA source  Cardioverted to sinus, patach  Bidirectional block successful  Load flecainide, 200 now and 100mg bid [id number] [address] tomorrow if ok

## 2017-05-18 NOTE — PROCEDURES
LV EF 55%  RV normal function    Aortic normal  Tricuspid normal mild TR  Mitral moderate MR multiple jets  Pulmonic normal    HANY decreased velocity with mild smoke, no thrombus  Bubble study negative no PFO.

## 2017-05-18 NOTE — PROGRESS NOTES
[facility] - [facility]  Hospitalist Progress  Note     Dolphus Christa Patient Status:  Inpatient    3/21/1940  65year old [facility] [id number]   Location 923/300-W Attending Ninfa Escobedo MD   Hosp Day # 3  Brooklynn Quiñonez MD     ASSESSMENT/PLAN rhonchi. Abd: Soft, NTND, BS normal, no mass, no HSM, no rebound/guarding. Neuro: Normal reflexes, CN. Sensory/motor exams grossly normal deficit. Coordination  and gait normal.   MS: No joint effusions. No peripheral edema. Left hip ecchymosis.   Mini

## 2017-05-18 NOTE — PLAN OF CARE
Problem: Patient Centered Care  Goal: Patient preferences are identified and integrated in the patient’s plan of care  Interventions:  - What would you like us to know as we care for you?  - Provide timely, complete, and accurate information to patient/fam

## 2017-05-19 VITALS
HEART RATE: 71 BPM | OXYGEN SATURATION: 95 % | WEIGHT: 91 LBS | SYSTOLIC BLOOD PRESSURE: 131 MMHG | TEMPERATURE: 99 F | HEIGHT: 65 IN | RESPIRATION RATE: 18 BRPM | BODY MASS INDEX: 15.16 KG/M2 | DIASTOLIC BLOOD PRESSURE: 67 MMHG

## 2017-05-19 PROCEDURE — 99239 HOSP IP/OBS DSCHRG MGMT >30: CPT | Performed by: HOSPITALIST

## 2017-05-19 RX ORDER — METOPROLOL TARTRATE 100 MG/1
100 TABLET ORAL
Qty: 60 TABLET | Refills: 0 | Status: SHIPPED | OUTPATIENT
Start: 2017-05-19 | End: 2017-08-22

## 2017-05-19 RX ORDER — FLECAINIDE ACETATE 100 MG/1
100 TABLET ORAL EVERY 12 HOURS SCHEDULED
Qty: 60 TABLET | Refills: 0 | Status: SHIPPED | OUTPATIENT
Start: 2017-05-19 | End: 2019-03-12 | Stop reason: ALTCHOICE

## 2017-05-19 RX ORDER — MAGNESIUM OXIDE 400 MG (241.3 MG MAGNESIUM) TABLET
400 TABLET DAILY
Qty: 30 TABLET | Refills: 0 | Status: SHIPPED | OUTPATIENT
Start: 2017-05-19 | End: 2017-05-25

## 2017-05-19 NOTE — CM/SW NOTE
Met with patient and  at bedside regarding discharge planning. Dr. Raymond Ortiz recommends [facility]. Per patient and , patient does not want HHC as patient gets around well and has the support of her  and daughter if needed.   Patient states s

## 2017-05-19 NOTE — PROGRESS NOTES
[facility] HOSP - [facility]    Cardiology Progress Note    Thania Roper Patient Status:  Inpatient    3/21/1940 MRN [id number]   Location [facility] Attending Preet Encinas MD   Hosp Day # 4  Brookline Avanue Frieda Sicard, MD         Assessment a 05/19/2017   BUN 22* 05/19/2017   * 05/19/2017   K 4.5 05/19/2017   CL 96 05/19/2017   CO2 26 05/19/2017   * 05/19/2017   CA 8.6 05/19/2017   ALB 2.6* 05/19/2017   ALKPHO 125* 03/06/2017   BILT 0.9 03/06/2017   TP 7.7 03/06/2017   AST 28 03/06

## 2017-05-22 ENCOUNTER — TELEPHONE (OUTPATIENT)
Dept: INTERNAL MEDICINE UNIT | Facility: HOSPITAL | Age: 77
End: 2017-05-22

## 2017-05-22 ENCOUNTER — TELEPHONE (OUTPATIENT)
Dept: CARDIOLOGY UNIT | Facility: HOSPITAL | Age: 77
End: 2017-05-22

## 2017-05-22 NOTE — TELEPHONE ENCOUNTER
Patient discharged from [facility] on May 19, 2017. Please call patient to schedule hospital follow-up appointment with PCP, Dr. Daniel Mary.   Patient was seen in the hospital for Paroxysmal atrial fibrillation/flutter with rapid ventricular response and

## 2017-05-23 NOTE — TELEPHONE ENCOUNTER
Patient contacted and Dr. Mylene Rogers message read. Patient will see Cardiology as advised and will call to schedule an appointment with Dr. Sid Krishna at a later date.

## 2017-05-25 ENCOUNTER — OFFICE VISIT (OUTPATIENT)
Dept: CARDIOLOGY CLINIC | Facility: HOSPITAL | Age: 77
End: 2017-05-25
Attending: INTERNAL MEDICINE
Payer: MEDICARE

## 2017-05-25 VITALS
WEIGHT: 93 LBS | OXYGEN SATURATION: 100 % | HEART RATE: 57 BPM | DIASTOLIC BLOOD PRESSURE: 72 MMHG | SYSTOLIC BLOOD PRESSURE: 184 MMHG | BODY MASS INDEX: 15 KG/M2

## 2017-05-25 DIAGNOSIS — I10 HTN (HYPERTENSION), BENIGN: Chronic | ICD-10-CM

## 2017-05-25 DIAGNOSIS — Z86.79 S/P ABLATION OF ATRIAL FLUTTER: ICD-10-CM

## 2017-05-25 DIAGNOSIS — Z86.79 HISTORY OF ATRIAL FIBRILLATION: ICD-10-CM

## 2017-05-25 DIAGNOSIS — I48.91 ATRIAL FIBRILLATION (HCC): Primary | ICD-10-CM

## 2017-05-25 DIAGNOSIS — S70.02XD HEMATOMA OF HIP, LEFT, SUBSEQUENT ENCOUNTER: ICD-10-CM

## 2017-05-25 DIAGNOSIS — I48.0 PAROXYSMAL ATRIAL FIBRILLATION (HCC): ICD-10-CM

## 2017-05-25 DIAGNOSIS — Z98.890 S/P ABLATION OF ATRIAL FLUTTER: ICD-10-CM

## 2017-05-25 DIAGNOSIS — I48.3 TYPICAL ATRIAL FLUTTER (HCC): ICD-10-CM

## 2017-05-25 PROCEDURE — 93005 ELECTROCARDIOGRAM TRACING: CPT

## 2017-05-25 PROCEDURE — 85025 COMPLETE CBC W/AUTO DIFF WBC: CPT | Performed by: NURSE PRACTITIONER

## 2017-05-25 PROCEDURE — 36415 COLL VENOUS BLD VENIPUNCTURE: CPT | Performed by: NURSE PRACTITIONER

## 2017-05-25 PROCEDURE — 93010 ELECTROCARDIOGRAM REPORT: CPT | Performed by: NURSE PRACTITIONER

## 2017-05-25 PROCEDURE — 99212 OFFICE O/P EST SF 10 MIN: CPT | Performed by: NURSE PRACTITIONER

## 2017-05-25 PROCEDURE — 83735 ASSAY OF MAGNESIUM: CPT | Performed by: NURSE PRACTITIONER

## 2017-05-25 PROCEDURE — 99214 OFFICE O/P EST MOD 30 MIN: CPT | Performed by: NURSE PRACTITIONER

## 2017-05-25 PROCEDURE — 80048 BASIC METABOLIC PNL TOTAL CA: CPT | Performed by: NURSE PRACTITIONER

## 2017-05-25 RX ORDER — MAGNESIUM OXIDE 400 MG (241.3 MG MAGNESIUM) TABLET
400 TABLET 2 TIMES DAILY
Qty: 60 TABLET | Refills: 0 | Status: SHIPPED | OUTPATIENT
Start: 2017-05-25 | End: 2017-08-23

## 2017-05-25 NOTE — PROGRESS NOTES
Tamme 62 Patient Status:  Outpatient    3/21/1940 MRN [id number]   [place] [address]  MD Laxmi Jack MD Claven Rudder is a 65year old female who presents to clinic fever  Respiratory:  negative for cough, hemoptysis and wheezing  Cardiovascular: negative for chest pain, exertional chest pressure/discomfort, near-syncope, orthopnea and palpitations  Gastrointestinal: negative for abdominal pain, diarrhea, melena, naus ecchymosis and moderate /large left hip hematoma , moderately soft.      Cardiographics:  EC2017 sinus bradycardia 56 bpm, first-degree AV block,  ms  MARCELINO:17, no thrombus, EF 55-60%, moderate MR, no PFO   Echocardiogram:17 1 EF 75% lungs clear, no abdominal distention or lower extremity edema. Right groin site dry and intact no hematoma or ecchymosis or swelling. Left hip/groin with large ecchymosis and soft hematoma at left hip resolving.   Not drinking adequate fluids and appears

## 2017-05-25 NOTE — PATIENT INSTRUCTIONS
Continue all your same medications eluding Xarelto 15 mg 1 tablet daily, start today and take with food    Increase magnesium oxide to 400 mg 1 tablet twice daily    Call if having any dizziness, lightheadedness, heart racing, palpitations, chest pain, azeem

## 2017-05-30 ENCOUNTER — PRIOR ORIGINAL RECORDS (OUTPATIENT)
Dept: OTHER | Age: 77
End: 2017-05-30

## 2017-06-01 ENCOUNTER — PATIENT MESSAGE (OUTPATIENT)
Dept: GASTROENTEROLOGY | Facility: CLINIC | Age: 77
End: 2017-06-01

## 2017-06-02 NOTE — TELEPHONE ENCOUNTER
----- Message from [address] [place] [address] [address] Generic sent at 6/1/2017  2:15 PM CDT -----  Regarding: Non-Urgent Medical Question  Contact: [phone]  Hi Dr. Alexandre Torres,    My name is Osito Mishra and I am Imani's daughter.   She asked me you today to email you regarding

## 2017-06-02 NOTE — TELEPHONE ENCOUNTER
From: Junalyssa   To: Nallely Fabian MD  Sent: 6/1/2017 2:15 PM CDT  Subject: Non-Urgent Ival Pimple Dr. Brie Kimble,    My name is Kajal Ramirez and I am Imani's daughter.  She asked me you today to email you regarding her bowel movem

## 2017-06-08 ENCOUNTER — PRIOR ORIGINAL RECORDS (OUTPATIENT)
Dept: OTHER | Age: 77
End: 2017-06-08

## 2017-06-08 ENCOUNTER — OFFICE VISIT (OUTPATIENT)
Dept: CARDIOLOGY CLINIC | Facility: HOSPITAL | Age: 77
End: 2017-06-08
Attending: INTERNAL MEDICINE
Payer: MEDICARE

## 2017-06-08 VITALS
BODY MASS INDEX: 16 KG/M2 | HEART RATE: 56 BPM | WEIGHT: 94.38 LBS | DIASTOLIC BLOOD PRESSURE: 53 MMHG | SYSTOLIC BLOOD PRESSURE: 178 MMHG | OXYGEN SATURATION: 98 %

## 2017-06-08 DIAGNOSIS — I48.91 ATRIAL FIBRILLATION (HCC): Primary | ICD-10-CM

## 2017-06-08 DIAGNOSIS — I48.0 PAROXYSMAL ATRIAL FIBRILLATION (HCC): ICD-10-CM

## 2017-06-08 DIAGNOSIS — I10 HTN (HYPERTENSION), BENIGN: Chronic | ICD-10-CM

## 2017-06-08 DIAGNOSIS — I48.3 TYPICAL ATRIAL FLUTTER (HCC): ICD-10-CM

## 2017-06-08 DIAGNOSIS — E83.42 HYPOMAGNESEMIA: ICD-10-CM

## 2017-06-08 DIAGNOSIS — Z86.79 S/P ABLATION OF ATRIAL FLUTTER: ICD-10-CM

## 2017-06-08 DIAGNOSIS — Z86.79 HISTORY OF ATRIAL FIBRILLATION: ICD-10-CM

## 2017-06-08 DIAGNOSIS — E87.1 HYPONATREMIA: ICD-10-CM

## 2017-06-08 DIAGNOSIS — Z98.890 S/P ABLATION OF ATRIAL FLUTTER: ICD-10-CM

## 2017-06-08 PROCEDURE — 80048 BASIC METABOLIC PNL TOTAL CA: CPT | Performed by: NURSE PRACTITIONER

## 2017-06-08 PROCEDURE — 36415 COLL VENOUS BLD VENIPUNCTURE: CPT | Performed by: NURSE PRACTITIONER

## 2017-06-08 PROCEDURE — 99211 OFF/OP EST MAY X REQ PHY/QHP: CPT | Performed by: NURSE PRACTITIONER

## 2017-06-08 PROCEDURE — 83735 ASSAY OF MAGNESIUM: CPT | Performed by: NURSE PRACTITIONER

## 2017-06-08 PROCEDURE — 99214 OFFICE O/P EST MOD 30 MIN: CPT | Performed by: NURSE PRACTITIONER

## 2017-06-08 RX ORDER — AMLODIPINE BESYLATE 2.5 MG/1
2.5 TABLET ORAL DAILY
Qty: 30 TABLET | Refills: 0 | Status: SHIPPED | OUTPATIENT
Start: 2017-06-08 | End: 2017-09-07

## 2017-06-08 NOTE — PATIENT INSTRUCTIONS
Begin taking amlodipine 2.5 mg one tablet at bedtime    Take an additional dose of magnesium oxide tablet for 3 days then resume one tablet twice daily    Blood test for basic metabolic panel and magnesium level on Monday 6-15-17    Continue all your same

## 2017-06-08 NOTE — PROGRESS NOTES
Rick 62 Patient Status:  Outpatient    3/21/1940 MRN [id number]   [place] [address]  MD Maritza Hall MD Roosevelt Callas is a 65year old female who presents to clinic fever  Respiratory:  negative for cough, hemoptysis and wheezing  Cardiovascular: negative for chest pain, exertional chest pressure/discomfort, near-syncope, orthopnea and palpitations  Gastrointestinal: negative for abdominal pain, diarrhea, melena, naus ms  MARCELINO:5-18-17, no thrombus, EF 55-60%, moderate MR, no PFO   Echocardiogram:5-16-17 1 EF 75% with moderate MR/TR, PA 31 mmHg    Diagnostic tests:   CXR: 5/17/2017 stable small right apical pneumo, no sign of fluid congestion or infiltrate      Education: 6-15-17    Continue all your other same medications    Call if having any dizziness, lightheadedness, heart racing, palpitations, chest pain, shortness of breath, coughing, swelling, weight gain or worsening symptoms.      Weigh yourself daily in the mornin

## 2017-06-13 ENCOUNTER — PRIOR ORIGINAL RECORDS (OUTPATIENT)
Dept: OTHER | Age: 77
End: 2017-06-13

## 2017-06-13 ENCOUNTER — MYAURORA ACCOUNT LINK (OUTPATIENT)
Dept: OTHER | Age: 77
End: 2017-06-13

## 2017-06-13 LAB
BUN: 10 MG/DL
CALCIUM: 8.7 MG/DL
CHLORIDE: 92 MEQ/L
CREATININE, SERUM: 0.58 MG/DL
GLUCOSE: 92 MG/DL
MAGNESIUM: 1.6 MG/DL
POTASSIUM, SERUM: 4.5 MEQ/L
SODIUM: 127 MEQ/L

## 2017-06-15 ENCOUNTER — APPOINTMENT (OUTPATIENT)
Dept: LAB | Age: 77
End: 2017-06-15
Attending: NURSE PRACTITIONER
Payer: MEDICARE

## 2017-06-15 DIAGNOSIS — I10 HTN (HYPERTENSION), BENIGN: Chronic | ICD-10-CM

## 2017-06-15 DIAGNOSIS — Z98.890 S/P ABLATION OF ATRIAL FLUTTER: ICD-10-CM

## 2017-06-15 DIAGNOSIS — E83.42 HYPOMAGNESEMIA: ICD-10-CM

## 2017-06-15 DIAGNOSIS — Z86.79 S/P ABLATION OF ATRIAL FLUTTER: ICD-10-CM

## 2017-06-15 DIAGNOSIS — E87.1 HYPONATREMIA: ICD-10-CM

## 2017-06-15 PROCEDURE — 83735 ASSAY OF MAGNESIUM: CPT

## 2017-06-15 PROCEDURE — 80048 BASIC METABOLIC PNL TOTAL CA: CPT

## 2017-06-15 PROCEDURE — 36415 COLL VENOUS BLD VENIPUNCTURE: CPT

## 2017-06-16 ENCOUNTER — OFFICE VISIT (OUTPATIENT)
Dept: NEPHROLOGY | Facility: CLINIC | Age: 77
End: 2017-06-16

## 2017-06-16 VITALS
SYSTOLIC BLOOD PRESSURE: 171 MMHG | HEIGHT: 65.5 IN | WEIGHT: 89.38 LBS | DIASTOLIC BLOOD PRESSURE: 73 MMHG | HEART RATE: 59 BPM | BODY MASS INDEX: 14.71 KG/M2

## 2017-06-16 DIAGNOSIS — R79.0 LOW MAGNESIUM LEVEL: Primary | ICD-10-CM

## 2017-06-16 DIAGNOSIS — S70.02XD HEMATOMA OF HIP, LEFT, SUBSEQUENT ENCOUNTER: ICD-10-CM

## 2017-06-16 DIAGNOSIS — I10 HTN (HYPERTENSION), BENIGN: Chronic | ICD-10-CM

## 2017-06-16 DIAGNOSIS — Z86.79 HISTORY OF ATRIAL FIBRILLATION: ICD-10-CM

## 2017-06-16 PROCEDURE — G0463 HOSPITAL OUTPT CLINIC VISIT: HCPCS | Performed by: INTERNAL MEDICINE

## 2017-06-16 PROCEDURE — 99214 OFFICE O/P EST MOD 30 MIN: CPT | Performed by: INTERNAL MEDICINE

## 2017-06-20 NOTE — PROGRESS NOTES
NEPHROLOGY PROGRESS NOTE  Karena Josiah     MRN:  [id number]   Date of Service:  6/16/17     HISTORY OF PRESENT ILLNESS: Ms. Alix Scott was here for followup. She was in the hospital with a bruised left femur.  When she was there, she was found to be in atrial f

## 2017-06-26 ENCOUNTER — APPOINTMENT (OUTPATIENT)
Dept: LAB | Age: 77
End: 2017-06-26
Attending: INTERNAL MEDICINE
Payer: MEDICARE

## 2017-06-26 DIAGNOSIS — R79.0 LOW MAGNESIUM LEVEL: ICD-10-CM

## 2017-06-26 PROCEDURE — 83735 ASSAY OF MAGNESIUM: CPT

## 2017-06-26 PROCEDURE — 36415 COLL VENOUS BLD VENIPUNCTURE: CPT

## 2017-06-26 PROCEDURE — 80048 BASIC METABOLIC PNL TOTAL CA: CPT | Performed by: INTERNAL MEDICINE

## 2017-06-26 PROCEDURE — 85025 COMPLETE CBC W/AUTO DIFF WBC: CPT | Performed by: INTERNAL MEDICINE

## 2017-06-28 ENCOUNTER — TELEPHONE (OUTPATIENT)
Dept: NEPHROLOGY | Facility: CLINIC | Age: 77
End: 2017-06-28

## 2017-06-29 NOTE — TELEPHONE ENCOUNTER
Spoke with patient, read Dr Loy Cooper message regarding normal labs. Patient verbalizes understanding of message as given.

## 2017-06-29 NOTE — TELEPHONE ENCOUNTER
Please tell her labs look good. .  =her mag is normal. She should keep eating figs and dark chocolate!

## 2017-08-01 ENCOUNTER — TELEPHONE (OUTPATIENT)
Dept: NEPHROLOGY | Facility: CLINIC | Age: 77
End: 2017-08-01

## 2017-08-01 DIAGNOSIS — Z99.2 CKD (CHRONIC KIDNEY DISEASE) STAGE V REQUIRING CHRONIC DIALYSIS (HCC): Primary | ICD-10-CM

## 2017-08-01 DIAGNOSIS — N18.6 CKD (CHRONIC KIDNEY DISEASE) STAGE V REQUIRING CHRONIC DIALYSIS (HCC): Primary | ICD-10-CM

## 2017-08-01 RX ORDER — CIPROFLOXACIN 500 MG/1
500 TABLET, FILM COATED ORAL 2 TIMES DAILY
Qty: 10 TABLET | Refills: 0 | Status: SHIPPED | OUTPATIENT
Start: 2017-08-01 | End: 2017-10-04 | Stop reason: ALTCHOICE

## 2017-08-01 NOTE — TELEPHONE ENCOUNTER
Spoke with pt and obtained permission to speak with Chasidy Helton. Pt has had symptoms of burning with urination and pink tinged urine x 5 days. Denies fever, chills, body aches or flank pain. Is hydrating. Requesting rx. MLC/RSA please advise.

## 2017-08-14 ENCOUNTER — APPOINTMENT (OUTPATIENT)
Dept: LAB | Age: 77
End: 2017-08-14
Attending: INTERNAL MEDICINE
Payer: MEDICARE

## 2017-08-14 DIAGNOSIS — M35.01 SJOGREN'S SYNDROME WITH KERATOCONJUNCTIVITIS SICCA (HCC): ICD-10-CM

## 2017-08-14 LAB
ALBUMIN SERPL BCP-MCNC: 3.2 G/DL (ref 3.5–4.8)
ALBUMIN/GLOB SERPL: 0.7 {RATIO} (ref 1–2)
ALP SERPL-CCNC: 149 U/L (ref 32–100)
ALT SERPL-CCNC: 15 U/L (ref 14–54)
ANION GAP SERPL CALC-SCNC: 9 MMOL/L (ref 0–18)
AST SERPL-CCNC: 35 U/L (ref 15–41)
BILIRUB SERPL-MCNC: 0.7 MG/DL (ref 0.3–1.2)
BUN SERPL-MCNC: 13 MG/DL (ref 8–20)
BUN/CREAT SERPL: 18.8 (ref 10–20)
C3 SERPL-MCNC: 90 MG/DL (ref 88–201)
C4 SERPL-MCNC: 13 MG/DL (ref 18–55)
CALCIUM SERPL-MCNC: 9.1 MG/DL (ref 8.5–10.5)
CHLORIDE SERPL-SCNC: 98 MMOL/L (ref 95–110)
CO2 SERPL-SCNC: 26 MMOL/L (ref 22–32)
CREAT SERPL-MCNC: 0.69 MG/DL (ref 0.5–1.5)
CRP SERPL-MCNC: 0.7 MG/DL (ref 0–0.9)
ERYTHROCYTE [DISTWIDTH] IN BLOOD BY AUTOMATED COUNT: 14.6 % (ref 11–15)
GLOBULIN PLAS-MCNC: 4.3 G/DL (ref 2.5–3.7)
GLUCOSE SERPL-MCNC: 85 MG/DL (ref 70–99)
HCT VFR BLD AUTO: 37.2 % (ref 35–48)
HGB BLD-MCNC: 12.7 G/DL (ref 12–16)
MCH RBC QN AUTO: 29 PG (ref 27–32)
MCHC RBC AUTO-ENTMCNC: 34.1 G/DL (ref 32–37)
MCV RBC AUTO: 85.2 FL (ref 80–100)
OSMOLALITY UR CALC.SUM OF ELEC: 275 MOSM/KG (ref 275–295)
PLATELET # BLD AUTO: 130 K/UL (ref 140–400)
PMV BLD AUTO: 7.4 FL (ref 7.4–10.3)
POTASSIUM SERPL-SCNC: 4.4 MMOL/L (ref 3.3–5.1)
PROT SERPL-MCNC: 7.5 G/DL (ref 5.9–8.4)
RBC # BLD AUTO: 4.36 M/UL (ref 3.7–5.4)
SODIUM SERPL-SCNC: 133 MMOL/L (ref 136–144)
WBC # BLD AUTO: 3 K/UL (ref 4–11)

## 2017-08-14 PROCEDURE — 80053 COMPREHEN METABOLIC PANEL: CPT

## 2017-08-14 PROCEDURE — 86160 COMPLEMENT ANTIGEN: CPT

## 2017-08-14 PROCEDURE — 86140 C-REACTIVE PROTEIN: CPT

## 2017-08-14 PROCEDURE — 86162 COMPLEMENT TOTAL (CH50): CPT

## 2017-08-14 PROCEDURE — 85027 COMPLETE CBC AUTOMATED: CPT

## 2017-08-14 PROCEDURE — 36415 COLL VENOUS BLD VENIPUNCTURE: CPT

## 2017-08-14 PROCEDURE — 84165 PROTEIN E-PHORESIS SERUM: CPT

## 2017-08-16 LAB
ALBUMIN SERPL ELPH-MCNC: 3.42 G/DL (ref 3.8–5.8)
ALBUMIN/GLOB SERPL: 0.9 {RATIO} (ref 1–2)
ALPHA1 GLOB SERPL ELPH-MCNC: 0.24 G/DL (ref 0.1–0.3)
ALPHA2 GLOB SERPL ELPH-MCNC: 0.85 G/DL (ref 0.6–1)
B-GLOBULIN SERPL ELPH-MCNC: 0.85 G/DL (ref 0.7–1.3)
COMPLEMENT ACTIVITY, TOTAL EIA: 44 CAE UNITS
GAMMA GLOB SERPL ELPH-MCNC: 1.84 G/DL (ref 0.5–1.7)
TOTAL PROTEIN (SPECIAL TESTING): 7.2 G/DL (ref 6.5–9.1)

## 2017-08-22 RX ORDER — METOPROLOL TARTRATE 50 MG/1
TABLET, FILM COATED ORAL
Qty: 180 TABLET | Refills: 1 | Status: SHIPPED | OUTPATIENT
Start: 2017-08-22 | End: 2017-08-23 | Stop reason: DRUGHIGH

## 2017-08-22 RX ORDER — METOPROLOL TARTRATE 100 MG/1
100 TABLET ORAL
Qty: 14 TABLET | Refills: 0 | Status: SHIPPED | OUTPATIENT
Start: 2017-08-22 | End: 2018-01-26 | Stop reason: DRUGHIGH

## 2017-08-22 NOTE — TELEPHONE ENCOUNTER
Pt is waiting for mail order rx to come in mail and will be out of meds by tomorrow morning. Can we fill a short term rx to pharmacy?   [facility]  In [place] - see Med Review-  Pt is asking for a 2 week supply  Metoprolol Tartrate 100 MG Oral Tab

## 2017-08-23 ENCOUNTER — OFFICE VISIT (OUTPATIENT)
Dept: NEPHROLOGY | Facility: CLINIC | Age: 77
End: 2017-08-23

## 2017-08-23 VITALS
BODY MASS INDEX: 15.27 KG/M2 | DIASTOLIC BLOOD PRESSURE: 70 MMHG | HEIGHT: 65 IN | HEART RATE: 75 BPM | SYSTOLIC BLOOD PRESSURE: 156 MMHG | WEIGHT: 91.63 LBS

## 2017-08-23 DIAGNOSIS — I48.0 PAROXYSMAL ATRIAL FIBRILLATION (HCC): Primary | ICD-10-CM

## 2017-08-23 DIAGNOSIS — M06.9 RHEUMATOID ARTHRITIS INVOLVING MULTIPLE SITES, UNSPECIFIED RHEUMATOID FACTOR PRESENCE: ICD-10-CM

## 2017-08-23 DIAGNOSIS — I10 ESSENTIAL HYPERTENSION: ICD-10-CM

## 2017-08-23 PROCEDURE — 99213 OFFICE O/P EST LOW 20 MIN: CPT | Performed by: INTERNAL MEDICINE

## 2017-08-23 PROCEDURE — G0463 HOSPITAL OUTPT CLINIC VISIT: HCPCS | Performed by: INTERNAL MEDICINE

## 2017-08-23 RX ORDER — METOPROLOL TARTRATE 50 MG/1
TABLET, FILM COATED ORAL
Qty: 28 TABLET | Refills: 1 | Status: SHIPPED | OUTPATIENT
Start: 2017-08-23 | End: 2018-07-20

## 2017-08-23 RX ORDER — RIVAROXABAN 20 MG/1
20 TABLET, FILM COATED ORAL DAILY
Refills: 6 | COMMUNITY
Start: 2017-07-31 | End: 2019-03-12 | Stop reason: ALTCHOICE

## 2017-08-23 RX ORDER — METOPROLOL TARTRATE 50 MG/1
TABLET, FILM COATED ORAL
Qty: 180 TABLET | Refills: 3 | Status: SHIPPED | OUTPATIENT
Start: 2017-08-23 | End: 2017-08-23

## 2017-08-23 NOTE — PATIENT INSTRUCTIONS
1  LILIYA  GOOD JOB    2. ASK CARDIOLOGY AB OUT  XARELTO NEED    3. SEE RHEUMATOLOGY      4. SEE ME December OR January    NO LABS NEEDED     HAVE A GREAT REST OF SUMMER    TYLENOL. ICY HOT. HEAT TO BACK.  SIT UP STRAIGHT

## 2017-08-24 NOTE — PROGRESS NOTES
NEPHROLOGY PROGRESS NOTE  Adalberto Faulkner     MRN:  [id number]   Date of Service:  8/23/17     HISTORY OF PRESENT ILLNESS: The patient is here. She has a history of rheumatoid arthritis, hypertension, recent fall, and atrial fibrillation.  She sees Dr. Schuyler Calles fo

## 2017-09-05 ENCOUNTER — PRIOR ORIGINAL RECORDS (OUTPATIENT)
Dept: OTHER | Age: 77
End: 2017-09-05

## 2017-09-07 RX ORDER — AMLODIPINE BESYLATE 2.5 MG/1
2.5 TABLET ORAL DAILY
Qty: 30 TABLET | Refills: 0 | Status: SHIPPED | OUTPATIENT
Start: 2017-09-07 | End: 2018-08-14

## 2017-09-12 ENCOUNTER — TELEPHONE (OUTPATIENT)
Dept: NEPHROLOGY | Facility: CLINIC | Age: 77
End: 2017-09-12

## 2017-09-12 NOTE — TELEPHONE ENCOUNTER
Spoke to patient's daughter Clover Canas and relayed Dr. Trudi Coughlin advice message. Also reviewed AVS and the reason the office gives them to patients as this is filled with much information for patients.

## 2017-09-12 NOTE — TELEPHONE ENCOUNTER
IN THE AFTER VISIT SUMMARY DATED LATE AUGUST. . PT WAS TOLD NO LABS TIL January   WE NEED TO REINFORCE THAT PATIENTS READ THIS. OF COURSE I DID GO OVER IT WITH PT FOR OVER TEN MINUTES THAT DAY. Guadalupe Riedel

## 2017-09-12 NOTE — TELEPHONE ENCOUNTER
Who is Crissie Micky. .  The psr MUST IDENTIFY  WHO IS CALLING,  I CANNOT DUE TO A LAW CALLED [facility] RELESE ANY INFO TO RANDOM PEOPLE. Linda Velasco ALSO PT HAD LABS DONE 3 WEEKS AGO. Linda Velasco

## 2017-09-18 ENCOUNTER — PRIOR ORIGINAL RECORDS (OUTPATIENT)
Dept: OTHER | Age: 77
End: 2017-09-18

## 2017-09-18 RX ORDER — AMLODIPINE BESYLATE 2.5 MG/1
2.5 TABLET ORAL DAILY
Qty: 30 TABLET | Refills: 0 | Status: CANCELLED
Start: 2017-09-18

## 2017-09-26 NOTE — PROGRESS NOTES
Sai Tiradoelanamarques is a 65year old woman with autoimmune disorder. She continues to feel that she's been doing well. She had severe joint destruction in the past, but denies increased swelling in the morning or significant stiffness in recent years. She's had no rash. Oral Tab Take 325 mg by mouth every 6 (six) hours as needed for Pain. Disp:  Rfl:    vitamin B-12 (CYANOCOBALAMIN) 50 MCG Oral Tab Take 500 mcg by mouth daily. Disp:  Rfl:    folic acid (FOLVITE) 434 MCG Oral Tab Take 400 mcg by mouth daily.  Disp:  Rfl: gammopathy. All is stable. Her leukopenia and thrombocytopenia will be followed closely. There isn't an indication for immunosuppressives at this point. Labs will be done again in 6 months, and include an SPEP.  I will keep her on hydroxychloroquine 400 mg

## 2017-09-27 ENCOUNTER — OFFICE VISIT (OUTPATIENT)
Dept: RHEUMATOLOGY | Facility: CLINIC | Age: 77
End: 2017-09-27

## 2017-09-27 VITALS
HEIGHT: 65 IN | SYSTOLIC BLOOD PRESSURE: 153 MMHG | WEIGHT: 93 LBS | DIASTOLIC BLOOD PRESSURE: 74 MMHG | HEART RATE: 59 BPM | BODY MASS INDEX: 15.49 KG/M2

## 2017-09-27 DIAGNOSIS — M35.01 SJOGREN'S SYNDROME WITH KERATOCONJUNCTIVITIS SICCA (HCC): Primary | ICD-10-CM

## 2017-09-27 DIAGNOSIS — M06.09 SERONEGATIVE RHEUMATOID ARTHRITIS OF MULTIPLE SITES (HCC): ICD-10-CM

## 2017-09-27 PROCEDURE — 99213 OFFICE O/P EST LOW 20 MIN: CPT | Performed by: INTERNAL MEDICINE

## 2017-09-27 PROCEDURE — G0463 HOSPITAL OUTPT CLINIC VISIT: HCPCS | Performed by: INTERNAL MEDICINE

## 2017-10-04 ENCOUNTER — TELEPHONE (OUTPATIENT)
Dept: NEPHROLOGY | Facility: CLINIC | Age: 77
End: 2017-10-04

## 2017-10-04 RX ORDER — LEVOFLOXACIN 250 MG/1
250 TABLET ORAL DAILY
Qty: 7 TABLET | Refills: 0 | Status: SHIPPED | OUTPATIENT
Start: 2017-10-04 | End: 2017-10-05

## 2017-10-04 NOTE — TELEPHONE ENCOUNTER
Contacted daughter, Klever Poe, and notified her of [facility]'s message below. Rx sent. She will keep us updated if Imani's symptoms do not clear up so we can send her to urology.      When sending rx for levaquin, an interaction warning popped up:    Cardiac arrhythm

## 2017-10-04 NOTE — TELEPHONE ENCOUNTER
Spoke to patient's daughtr. Symptoms started a few days ago--burning with urination, denies any blood in urine. Asking if patient is taking any medication that might be causing UTIs as this is the 2nd one patient has had in 6 weeks.

## 2017-10-05 RX ORDER — SULFAMETHOXAZOLE AND TRIMETHOPRIM 800; 160 MG/1; MG/1
1 TABLET ORAL 2 TIMES DAILY
Qty: 10 TABLET | Refills: 0 | Status: SHIPPED | OUTPATIENT
Start: 2017-10-05 | End: 2017-10-10

## 2017-10-05 NOTE — TELEPHONE ENCOUNTER
Spoke to Andrew, pharmacist. Pt has not yet picked up Levaquin so I requested that they cancel the rx and I will send rx for Bactrim per [facility].

## 2017-11-27 ENCOUNTER — HOSPITAL ENCOUNTER (OUTPATIENT)
Dept: MAMMOGRAPHY | Facility: HOSPITAL | Age: 77
Discharge: HOME | End: 2017-11-27
Attending: OBSTETRICS & GYNECOLOGY
Payer: MEDICARE

## 2017-11-27 DIAGNOSIS — Z85.3 HX: BREAST CANCER: ICD-10-CM

## 2017-11-27 PROCEDURE — 77065 DX MAMMO INCL CAD UNI: CPT | Performed by: OBSTETRICS & GYNECOLOGY

## 2018-01-11 RX ORDER — HYDROXYCHLOROQUINE SULFATE 200 MG/1
200 TABLET, FILM COATED ORAL 2 TIMES DAILY
Qty: 180 TABLET | Refills: 0 | Status: SHIPPED
Start: 2018-01-11 | End: 2018-08-22

## 2018-01-11 NOTE — TELEPHONE ENCOUNTER
From: Gutierrez Hogue  Sent: 1/11/2018 10:52 AM CST  Subject: Medication Renewal Request    Sigmund Barks L. Cindia Bosworth would like a refill of the following medications:     Hydroxychloroquine Sulfate 200 MG Oral Tab Paola Zhang MD]    Preferred pharmacy: [address] [place] [address] [phone], [phone]

## 2018-01-26 ENCOUNTER — OFFICE VISIT (OUTPATIENT)
Dept: NEPHROLOGY | Facility: CLINIC | Age: 78
End: 2018-01-26

## 2018-01-26 VITALS
SYSTOLIC BLOOD PRESSURE: 150 MMHG | HEIGHT: 65 IN | BODY MASS INDEX: 16.19 KG/M2 | DIASTOLIC BLOOD PRESSURE: 76 MMHG | WEIGHT: 97.19 LBS | HEART RATE: 84 BPM

## 2018-01-26 DIAGNOSIS — M81.0 AGE RELATED OSTEOPOROSIS, UNSPECIFIED PATHOLOGICAL FRACTURE PRESENCE: ICD-10-CM

## 2018-01-26 DIAGNOSIS — I10 ESSENTIAL HYPERTENSION: Primary | ICD-10-CM

## 2018-01-26 DIAGNOSIS — M05.79 RHEUMATOID ARTHRITIS INVOLVING MULTIPLE SITES WITH POSITIVE RHEUMATOID FACTOR (HCC): ICD-10-CM

## 2018-01-26 PROCEDURE — 99213 OFFICE O/P EST LOW 20 MIN: CPT | Performed by: INTERNAL MEDICINE

## 2018-01-26 PROCEDURE — G0463 HOSPITAL OUTPT CLINIC VISIT: HCPCS | Performed by: INTERNAL MEDICINE

## 2018-01-26 NOTE — PATIENT INSTRUCTIONS
Please get labs early march     see me over summer     good job with everything [facility]     Ask cardiologist about blood thinner

## 2018-01-30 ENCOUNTER — PRIOR ORIGINAL RECORDS (OUTPATIENT)
Dept: OTHER | Age: 78
End: 2018-01-30

## 2018-01-30 NOTE — PROGRESS NOTES
Sarah Maida is here for follow-up.   She is doing fine her rheumatoid arthritis has not changed she is up-to-date with her cardiologist and she like to get off her blood thinner for A. fib and she will see the cardiologist talk about that she has had no bleeding h

## 2018-02-26 ENCOUNTER — PRIOR ORIGINAL RECORDS (OUTPATIENT)
Dept: OTHER | Age: 78
End: 2018-02-26

## 2018-02-26 ENCOUNTER — LAB ENCOUNTER (OUTPATIENT)
Dept: LAB | Age: 78
End: 2018-02-26
Attending: INTERNAL MEDICINE
Payer: MEDICARE

## 2018-02-26 DIAGNOSIS — M81.0 AGE RELATED OSTEOPOROSIS, UNSPECIFIED PATHOLOGICAL FRACTURE PRESENCE: ICD-10-CM

## 2018-02-26 DIAGNOSIS — I10 ESSENTIAL HYPERTENSION: ICD-10-CM

## 2018-02-26 DIAGNOSIS — M05.79 RHEUMATOID ARTHRITIS INVOLVING MULTIPLE SITES WITH POSITIVE RHEUMATOID FACTOR (HCC): ICD-10-CM

## 2018-02-26 LAB
ALBUMIN SERPL BCP-MCNC: 3.4 G/DL (ref 3.5–4.8)
ALBUMIN/GLOB SERPL: 0.8 {RATIO} (ref 1–2)
ALP SERPL-CCNC: 124 U/L (ref 32–100)
ALT SERPL-CCNC: 12 U/L (ref 14–54)
ANION GAP SERPL CALC-SCNC: 6 MMOL/L (ref 0–18)
AST SERPL-CCNC: 32 U/L (ref 15–41)
BASOPHILS # BLD: 0 K/UL (ref 0–0.2)
BASOPHILS NFR BLD: 1 %
BILIRUB SERPL-MCNC: 0.8 MG/DL (ref 0.3–1.2)
BILIRUB UR QL: NEGATIVE
BUN SERPL-MCNC: 11 MG/DL (ref 8–20)
BUN/CREAT SERPL: 16.9 (ref 10–20)
C3 SERPL-MCNC: 89 MG/DL (ref 88–201)
C4 SERPL-MCNC: 11 MG/DL (ref 18–55)
CALCIUM SERPL-MCNC: 9.1 MG/DL (ref 8.5–10.5)
CHLORIDE SERPL-SCNC: 98 MMOL/L (ref 95–110)
CHOLEST SERPL-MCNC: 187 MG/DL (ref 110–200)
CO2 SERPL-SCNC: 27 MMOL/L (ref 22–32)
COLOR UR: YELLOW
CREAT SERPL-MCNC: 0.65 MG/DL (ref 0.5–1.5)
CRP SERPL-MCNC: 0.8 MG/DL (ref 0–0.9)
EOSINOPHIL # BLD: 0.1 K/UL (ref 0–0.7)
EOSINOPHIL NFR BLD: 2 %
ERYTHROCYTE [DISTWIDTH] IN BLOOD BY AUTOMATED COUNT: 14.3 % (ref 11–15)
GLOBULIN PLAS-MCNC: 4.2 G/DL (ref 2.5–3.7)
GLUCOSE SERPL-MCNC: 90 MG/DL (ref 70–99)
GLUCOSE UR-MCNC: NEGATIVE MG/DL
HCT VFR BLD AUTO: 38.3 % (ref 35–48)
HDLC SERPL-MCNC: 98 MG/DL
HGB BLD-MCNC: 13 G/DL (ref 12–16)
KETONES UR-MCNC: NEGATIVE MG/DL
LDLC SERPL CALC-MCNC: 78 MG/DL (ref 0–99)
LYMPHOCYTES # BLD: 1.1 K/UL (ref 1–4)
LYMPHOCYTES NFR BLD: 40 %
MAGNESIUM SERPL-MCNC: 1.6 MG/DL (ref 1.8–2.5)
MCH RBC QN AUTO: 28.9 PG (ref 27–32)
MCHC RBC AUTO-ENTMCNC: 34.1 G/DL (ref 32–37)
MCV RBC AUTO: 84.9 FL (ref 80–100)
MONOCYTES # BLD: 0.3 K/UL (ref 0–1)
MONOCYTES NFR BLD: 11 %
NEUTROPHILS # BLD AUTO: 1.3 K/UL (ref 1.8–7.7)
NEUTROPHILS NFR BLD: 46 %
NITRITE UR QL STRIP.AUTO: POSITIVE
NONHDLC SERPL-MCNC: 89 MG/DL
OSMOLALITY UR CALC.SUM OF ELEC: 271 MOSM/KG (ref 275–295)
PATIENT FASTING: YES
PH UR: 5 [PH] (ref 5–8)
PLATELET # BLD AUTO: 116 K/UL (ref 140–400)
PMV BLD AUTO: 6.9 FL (ref 7.4–10.3)
POTASSIUM SERPL-SCNC: 4.2 MMOL/L (ref 3.3–5.1)
PROT SERPL-MCNC: 7.6 G/DL (ref 5.9–8.4)
PROT UR-MCNC: NEGATIVE MG/DL
RBC # BLD AUTO: 4.51 M/UL (ref 3.7–5.4)
RBC #/AREA URNS AUTO: 2 /HPF
SODIUM SERPL-SCNC: 131 MMOL/L (ref 136–144)
SP GR UR STRIP: 1.02 (ref 1–1.03)
TRIGL SERPL-MCNC: 56 MG/DL (ref 1–149)
TSH SERPL-ACNC: 3.21 UIU/ML (ref 0.45–5.33)
UROBILINOGEN UR STRIP-ACNC: <2
VIT C UR-MCNC: NEGATIVE MG/DL
WBC # BLD AUTO: 2.9 K/UL (ref 4–11)
WBC #/AREA URNS AUTO: 3 /HPF

## 2018-02-26 PROCEDURE — 83735 ASSAY OF MAGNESIUM: CPT

## 2018-02-26 PROCEDURE — 80053 COMPREHEN METABOLIC PANEL: CPT

## 2018-02-26 PROCEDURE — 85025 COMPLETE CBC W/AUTO DIFF WBC: CPT

## 2018-02-26 PROCEDURE — 86140 C-REACTIVE PROTEIN: CPT

## 2018-02-26 PROCEDURE — 84443 ASSAY THYROID STIM HORMONE: CPT

## 2018-02-26 PROCEDURE — 80061 LIPID PANEL: CPT

## 2018-02-26 PROCEDURE — 86160 COMPLEMENT ANTIGEN: CPT

## 2018-02-26 PROCEDURE — 36415 COLL VENOUS BLD VENIPUNCTURE: CPT

## 2018-02-26 PROCEDURE — 81001 URINALYSIS AUTO W/SCOPE: CPT

## 2018-02-26 PROCEDURE — 82306 VITAMIN D 25 HYDROXY: CPT

## 2018-02-28 LAB — 25(OH)D3 SERPL-MCNC: 44 NG/ML

## 2018-03-07 LAB
ALBUMIN: 3.4 G/DL
ALT (SGPT): 12 U/L
AST (SGOT): 32 U/L
BILIRUBIN TOTAL: 0.8 MG/DL
BILIRUBIN TOTAL: 0.8 MG/DL
BUN: 11 MG/DL
CALCIUM: 9.1 MG/DL
CHLORIDE: 98 MEQ/L
CHOLESTEROL, TOTAL: 187 MG/DL
CREATININE, SERUM: 0.65 MG/DL
GLOBULIN: 4.2 G/DL
GLUCOSE: 90 MG/DL
GLUCOSE: 90 MG/DL
HDL CHOLESTEROL: 98 MG/DL
HEMATOCRIT: 38.3 %
HEMOGLOBIN: 13 G/DL
LDL CHOLESTEROL: 78 MG/DL
MAGNESIUM: 1.6 MG/DL
NON-HDL CHOLESTEROL: 89 MG/DL
PLATELETS: 116 K/UL
POTASSIUM, SERUM: 4.2 MEQ/L
PROTEIN, TOTAL: 7.6 G/DL
RED BLOOD COUNT: 4.51 X 10-6/U
SGOT (AST): 32 IU/L
SGPT (ALT): 12 IU/L
SODIUM: 131 MEQ/L
TRIGLYCERIDES: 56 MG/DL
VITAMIN D 25-OH: 44 NG/ML
WHITE BLOOD COUNT: 2.9 X 10-3/U

## 2018-03-13 ENCOUNTER — PRIOR ORIGINAL RECORDS (OUTPATIENT)
Dept: OTHER | Age: 78
End: 2018-03-13

## 2018-03-13 ENCOUNTER — MYAURORA ACCOUNT LINK (OUTPATIENT)
Dept: OTHER | Age: 78
End: 2018-03-13

## 2018-04-30 NOTE — PROGRESS NOTES
Baron Pennington is a 77year old woman with autoimmune disorder. She continues to feel that she's been doing well. She had severe joint destruction in the past, but denies increased swelling in the morning or significant stiffness in recent years. She's had no rash. Tab Take 400 mcg by mouth daily. Disp:  Rfl:    Lactobacillus Rhamnosus, GG, ([employer] PROBIOTIC, LACTOBACILLUS,) Oral Cap Take 1 tablet by mouth nightly. Disp:  Rfl:    loratadine (CLARITIN) 10 MG Oral Tab Take 10 mg by mouth as needed for Allergies.  Disp:  Rf her on hydroxychloroquine 400 mg a day. She has tolerated it very well. She'll return in 6 months. 2. Osteoporosis on alendronate holiday, calcium, and vitamin D.   3. History of right breast cancer, without activity.

## 2018-05-01 ENCOUNTER — OFFICE VISIT (OUTPATIENT)
Dept: RHEUMATOLOGY | Facility: CLINIC | Age: 78
End: 2018-05-01

## 2018-05-01 ENCOUNTER — APPOINTMENT (OUTPATIENT)
Dept: LAB | Age: 78
End: 2018-05-01
Attending: INTERNAL MEDICINE
Payer: MEDICARE

## 2018-05-01 VITALS
SYSTOLIC BLOOD PRESSURE: 183 MMHG | BODY MASS INDEX: 16.39 KG/M2 | HEART RATE: 64 BPM | DIASTOLIC BLOOD PRESSURE: 73 MMHG | HEIGHT: 65 IN | WEIGHT: 98.38 LBS

## 2018-05-01 DIAGNOSIS — M06.09 SERONEGATIVE RHEUMATOID ARTHRITIS OF MULTIPLE SITES (HCC): ICD-10-CM

## 2018-05-01 DIAGNOSIS — M35.01 SJOGREN'S SYNDROME WITH KERATOCONJUNCTIVITIS SICCA (HCC): ICD-10-CM

## 2018-05-01 DIAGNOSIS — M06.09 SERONEGATIVE RHEUMATOID ARTHRITIS OF MULTIPLE SITES (HCC): Primary | ICD-10-CM

## 2018-05-01 PROCEDURE — 85027 COMPLETE CBC AUTOMATED: CPT

## 2018-05-01 PROCEDURE — 80053 COMPREHEN METABOLIC PANEL: CPT

## 2018-05-01 PROCEDURE — 36415 COLL VENOUS BLD VENIPUNCTURE: CPT

## 2018-05-01 PROCEDURE — G0463 HOSPITAL OUTPT CLINIC VISIT: HCPCS | Performed by: INTERNAL MEDICINE

## 2018-05-01 PROCEDURE — 84165 PROTEIN E-PHORESIS SERUM: CPT

## 2018-05-01 PROCEDURE — 99213 OFFICE O/P EST LOW 20 MIN: CPT | Performed by: INTERNAL MEDICINE

## 2018-06-20 ENCOUNTER — OFFICE VISIT (OUTPATIENT)
Dept: NEPHROLOGY | Facility: CLINIC | Age: 78
End: 2018-06-20

## 2018-06-20 VITALS
DIASTOLIC BLOOD PRESSURE: 75 MMHG | BODY MASS INDEX: 16.86 KG/M2 | WEIGHT: 101.19 LBS | SYSTOLIC BLOOD PRESSURE: 171 MMHG | HEIGHT: 65 IN | HEART RATE: 78 BPM

## 2018-06-20 DIAGNOSIS — R06.02 SOB (SHORTNESS OF BREATH): Primary | ICD-10-CM

## 2018-06-20 DIAGNOSIS — I10 ESSENTIAL HYPERTENSION: ICD-10-CM

## 2018-06-20 PROCEDURE — 99213 OFFICE O/P EST LOW 20 MIN: CPT | Performed by: INTERNAL MEDICINE

## 2018-06-20 PROCEDURE — G0463 HOSPITAL OUTPT CLINIC VISIT: HCPCS | Performed by: INTERNAL MEDICINE

## 2018-06-20 NOTE — PATIENT INSTRUCTIONS
Mariana Caro,   good job  Please stay on amlodipine 2.5 day    Labs in early [place]ib[place] for orders    See me in November    Please do xray of chest at your convenience

## 2018-06-22 NOTE — PROGRESS NOTES
Mrs. Viet Grissom is here she is doing well she saw Dr. Jesse Lee recently for her rheumatoid arthritis and everything is stable.   Her blood pressure she brought in a running about 130/60 she currently is on Plaquenil and she is up-to-date with the eye doctor a

## 2018-07-11 ENCOUNTER — HOSPITAL ENCOUNTER (OUTPATIENT)
Dept: GENERAL RADIOLOGY | Age: 78
Discharge: HOME | End: 2018-07-11
Attending: INTERNAL MEDICINE
Payer: MEDICARE

## 2018-07-11 DIAGNOSIS — R06.02 SOB (SHORTNESS OF BREATH): ICD-10-CM

## 2018-07-11 PROCEDURE — 71046 X-RAY EXAM CHEST 2 VIEWS: CPT | Performed by: INTERNAL MEDICINE

## 2018-07-20 RX ORDER — METOPROLOL TARTRATE 50 MG/1
TABLET, FILM COATED ORAL
Qty: 180 TABLET | Refills: 3 | Status: SHIPPED | OUTPATIENT
Start: 2018-07-20 | End: 2019-09-28

## 2018-08-12 RX ORDER — AMLODIPINE BESYLATE 2.5 MG/1
2.5 TABLET ORAL DAILY
Qty: 30 TABLET | Refills: 0 | Status: CANCELLED
Start: 2018-08-12

## 2018-08-14 RX ORDER — AMLODIPINE BESYLATE 2.5 MG/1
2.5 TABLET ORAL DAILY
Qty: 90 TABLET | Refills: 0 | Status: SHIPPED | OUTPATIENT
Start: 2018-08-14 | End: 2018-10-03

## 2018-08-22 RX ORDER — HYDROXYCHLOROQUINE SULFATE 200 MG/1
200 TABLET, FILM COATED ORAL 2 TIMES DAILY
Qty: 180 TABLET | Refills: 0 | Status: SHIPPED
Start: 2018-08-22 | End: 2018-10-10

## 2018-08-22 NOTE — TELEPHONE ENCOUNTER
From: Wil Rose  Sent: 8/22/2018 10:31 AM CDT  Subject: Medication Renewal Request    Keri Fernandez would like a refill of the following medications:     Hydroxychloroquine Sulfate 200 MG Oral Tab [Aden Monsivais MD]   Patient Comment: Almost, seems to be doing the job    Preferred pharmacy: [address] [place] [address] [phone], [phone]

## 2018-08-22 NOTE — TELEPHONE ENCOUNTER
LOV:5-1  Last Filled:1-11, #180 with 1 refill  Labs:last eye exam 3-15-18 WNL  No future appointments.     Please Advise

## 2018-09-10 ENCOUNTER — TELEPHONE (OUTPATIENT)
Dept: NEPHROLOGY | Facility: CLINIC | Age: 78
End: 2018-09-10

## 2018-09-10 DIAGNOSIS — M06.09 RHEUMATOID ARTHRITIS OF MULTIPLE SITES WITHOUT RHEUMATOID FACTOR (HCC): Primary | ICD-10-CM

## 2018-09-10 DIAGNOSIS — I10 ESSENTIAL HYPERTENSION, BENIGN: ICD-10-CM

## 2018-09-11 ENCOUNTER — OFFICE VISIT (OUTPATIENT)
Dept: RHEUMATOLOGY | Facility: CLINIC | Age: 78
End: 2018-09-11
Payer: MEDICARE

## 2018-09-11 VITALS
BODY MASS INDEX: 16.76 KG/M2 | SYSTOLIC BLOOD PRESSURE: 127 MMHG | WEIGHT: 100.63 LBS | DIASTOLIC BLOOD PRESSURE: 72 MMHG | HEIGHT: 65 IN | HEART RATE: 66 BPM

## 2018-09-11 DIAGNOSIS — M35.01 SJOGREN'S SYNDROME WITH KERATOCONJUNCTIVITIS SICCA (HCC): ICD-10-CM

## 2018-09-11 DIAGNOSIS — M06.09 SERONEGATIVE RHEUMATOID ARTHRITIS OF MULTIPLE SITES (HCC): Primary | ICD-10-CM

## 2018-09-11 PROCEDURE — 99213 OFFICE O/P EST LOW 20 MIN: CPT | Performed by: INTERNAL MEDICINE

## 2018-09-11 PROCEDURE — G0463 HOSPITAL OUTPT CLINIC VISIT: HCPCS | Performed by: INTERNAL MEDICINE

## 2018-09-11 RX ORDER — CHOLECALCIFEROL (VITAMIN D3) 125 MCG
2000 CAPSULE ORAL DAILY
Qty: 30 TABLET | Refills: 0 | Status: CANCELLED | OUTPATIENT
Start: 2018-09-11

## 2018-09-11 NOTE — PROGRESS NOTES
HPI:    Patient ID: Samir Hardy is a 77year old female. Gabo Eastman is a 77year old woman with autoimmune disorder. She continues to feel that she's been doing well.  She had severe joint destruction in the past, but denies increased swelling in the [facility] daily.   Disp:  Rfl:   folic acid (FOLVITE) 274 MCG Oral Tab Take 400 mcg by mouth daily. Disp:  Rfl:   Lactobacillus Rhamnosus, GG, ([employer]S PROBIOTIC, LACTOBACILLUS,) Oral Cap Take 1 tablet by mouth nightly.  Disp:  Rfl:   loratadine (CLARITIN) 10 MG Oral Tab done today, including an SPEP. I will keep her on hydroxychloroquine 400 mg a day. She has tolerated it very well. She'll return in 6 months. Osteoporosis on alendronate holiday, calcium, and vitamin D. History of right breast cancer, without activity. types were placed in this encounter.       Meds This Visit:  Requested Prescriptions      No prescriptions requested or ordered in this encounter       Imaging & Referrals:  None       RS#[id number]

## 2018-09-12 NOTE — TELEPHONE ENCOUNTER
Spoke to patient and advised of lab orders to be done prior to her appointment with Dr. Berline Mohs scheduled on 11/12/18.

## 2018-09-24 ENCOUNTER — APPOINTMENT (OUTPATIENT)
Dept: LAB | Age: 78
End: 2018-09-24
Attending: INTERNAL MEDICINE
Payer: MEDICARE

## 2018-09-24 DIAGNOSIS — M35.01 SJOGREN'S SYNDROME WITH KERATOCONJUNCTIVITIS SICCA (HCC): ICD-10-CM

## 2018-09-24 LAB
ALBUMIN SERPL BCP-MCNC: 3.4 G/DL (ref 3.5–4.8)
ALBUMIN/GLOB SERPL: 0.8 {RATIO} (ref 1–2)
ALP SERPL-CCNC: 101 U/L (ref 32–100)
ALT SERPL-CCNC: 11 U/L (ref 14–54)
ANION GAP SERPL CALC-SCNC: 8 MMOL/L (ref 0–18)
AST SERPL-CCNC: 29 U/L (ref 15–41)
BILIRUB SERPL-MCNC: 0.8 MG/DL (ref 0.3–1.2)
BUN SERPL-MCNC: 7 MG/DL (ref 8–20)
BUN/CREAT SERPL: 10.3 (ref 10–20)
CALCIUM SERPL-MCNC: 8.8 MG/DL (ref 8.5–10.5)
CHLORIDE SERPL-SCNC: 96 MMOL/L (ref 95–110)
CO2 SERPL-SCNC: 25 MMOL/L (ref 22–32)
CREAT SERPL-MCNC: 0.68 MG/DL (ref 0.5–1.5)
ERYTHROCYTE [DISTWIDTH] IN BLOOD BY AUTOMATED COUNT: 14.7 % (ref 11–15)
GLOBULIN PLAS-MCNC: 4.2 G/DL (ref 2.5–3.7)
GLUCOSE SERPL-MCNC: 92 MG/DL (ref 70–99)
HCT VFR BLD AUTO: 37.5 % (ref 35–48)
HGB BLD-MCNC: 13 G/DL (ref 12–16)
MCH RBC QN AUTO: 29.4 PG (ref 27–32)
MCHC RBC AUTO-ENTMCNC: 34.7 G/DL (ref 32–37)
MCV RBC AUTO: 84.7 FL (ref 80–100)
OSMOLALITY UR CALC.SUM OF ELEC: 266 MOSM/KG (ref 275–295)
PATIENT FASTING: YES
PLATELET # BLD AUTO: 138 K/UL (ref 140–400)
PMV BLD AUTO: 7 FL (ref 7.4–10.3)
POTASSIUM SERPL-SCNC: 4.1 MMOL/L (ref 3.3–5.1)
PROT SERPL-MCNC: 7.6 G/DL (ref 5.9–8.4)
RBC # BLD AUTO: 4.43 M/UL (ref 3.7–5.4)
SODIUM SERPL-SCNC: 129 MMOL/L (ref 136–144)
WBC # BLD AUTO: 2.9 K/UL (ref 4–11)

## 2018-09-24 PROCEDURE — 85027 COMPLETE CBC AUTOMATED: CPT

## 2018-09-24 PROCEDURE — 36415 COLL VENOUS BLD VENIPUNCTURE: CPT

## 2018-09-24 PROCEDURE — 80053 COMPREHEN METABOLIC PANEL: CPT

## 2018-09-24 PROCEDURE — 84165 PROTEIN E-PHORESIS SERUM: CPT

## 2018-09-28 LAB
ALBUMIN SERPL ELPH-MCNC: 3.48 G/DL (ref 3.75–5.21)
ALBUMIN/GLOB SERPL: 0.87 {RATIO} (ref 1–2)
ALPHA1 GLOB SERPL ELPH-MCNC: 0.32 G/DL (ref 0.19–0.46)
ALPHA2 GLOB SERPL ELPH-MCNC: 0.73 G/DL (ref 0.48–1.05)
B-GLOBULIN SERPL ELPH-MCNC: 0.95 G/DL (ref 0.68–1.23)
GAMMA GLOB SERPL ELPH-MCNC: 2.03 G/DL (ref 0.62–1.7)
TOTAL PROTEIN (SPECIAL TESTING): 7.5 G/DL (ref 6.5–9.1)

## 2018-10-01 ENCOUNTER — TELEPHONE (OUTPATIENT)
Dept: NEPHROLOGY | Facility: CLINIC | Age: 78
End: 2018-10-01

## 2018-10-01 NOTE — TELEPHONE ENCOUNTER
Patient contacted. Results message from Dr. Frieda Sicard read to patient. She is aware to add salt to her diet as sodium level was a little low. Patient will do as advised by Dr. Frieda Sicard.

## 2018-10-03 RX ORDER — AMLODIPINE BESYLATE 2.5 MG/1
2.5 TABLET ORAL DAILY
Qty: 90 TABLET | Refills: 0 | Status: SHIPPED | OUTPATIENT
Start: 2018-10-03 | End: 2019-01-23

## 2018-10-10 RX ORDER — HYDROXYCHLOROQUINE SULFATE 200 MG/1
TABLET, FILM COATED ORAL
Qty: 180 TABLET | Refills: 1 | Status: SHIPPED | OUTPATIENT
Start: 2018-10-10 | End: 2019-03-01

## 2018-10-10 NOTE — TELEPHONE ENCOUNTER
LOV: 9/11/2018  Last eye exam: 3/15/2018, no toxicity  Future Appointments   Date Time Provider Agapito Perez   11/12/2018  1:40 PM Gage Nieves MD [facility] [place] ZAIDI   3/12/2019 11:00 AM Chavez Monsivais MD [facility] [place]     Labs:   Component      Latest Ref Rng & Units 9/24/2018   Glucose      70 - 99 mg/dL 92   Sodium      136 - 144 mmol/L 129 (L)   Potassium      3.3 - 5.1 mmol/L 4.1   Chloride      95 - 110 mmol/L 96   Carbon Dioxide, Total      22 - 32 mmol/L 25   BUN      8 - 20 mg/dL 7 (L)   CREATININE      0.50 - 1.50 mg/dL 0.68   CALCIUM      8.5 - 10.5 mg/dL 8.8   ALT (SGPT)      14 - 54 U/L 11 (L)   AST (SGOT)      15 - 41 U/L 29   ALKALINE PHOSPHATASE      32 - 100 U/L 101 (H)   Total Bilirubin      0.3 - 1.2 mg/dL 0.8   TOTAL PROTEIN      5.9 - 8.4 g/dL 7.6   Albumin      3.5 - 4.8 g/dL 3.4 (L)   GLOBULIN, TOTAL      2.5 - 3.7 g/dL 4.2 (H)   A/G RATIO      1.0 - 2.0 0.8 (L)   ANION GAP      0 - 18 mmol/L 8   BUN/CREA RATIO      10.0 - 20.0 10.3   CALCULATED OSMOLALITY      275 - 295 mOsm/kg 266 (L)   GFR, Non-      >=60 >60   GFR, -American      >=60 >60   Patient Fasting? Yes     Please advise.

## 2018-10-29 ENCOUNTER — LAB ENCOUNTER (OUTPATIENT)
Dept: LAB | Age: 78
End: 2018-10-29
Attending: INTERNAL MEDICINE
Payer: MEDICARE

## 2018-10-29 DIAGNOSIS — M06.09 RHEUMATOID ARTHRITIS OF MULTIPLE SITES WITHOUT RHEUMATOID FACTOR (HCC): ICD-10-CM

## 2018-10-29 DIAGNOSIS — I10 ESSENTIAL HYPERTENSION, BENIGN: ICD-10-CM

## 2018-10-29 PROCEDURE — 36415 COLL VENOUS BLD VENIPUNCTURE: CPT

## 2018-10-29 PROCEDURE — 85025 COMPLETE CBC W/AUTO DIFF WBC: CPT

## 2018-10-29 PROCEDURE — 80048 BASIC METABOLIC PNL TOTAL CA: CPT

## 2018-11-12 ENCOUNTER — OFFICE VISIT (OUTPATIENT)
Dept: NEPHROLOGY | Facility: CLINIC | Age: 78
End: 2018-11-12
Payer: MEDICARE

## 2018-11-12 VITALS
SYSTOLIC BLOOD PRESSURE: 154 MMHG | HEART RATE: 76 BPM | HEIGHT: 65 IN | BODY MASS INDEX: 16.53 KG/M2 | DIASTOLIC BLOOD PRESSURE: 78 MMHG | WEIGHT: 99.19 LBS

## 2018-11-12 DIAGNOSIS — I10 ESSENTIAL HYPERTENSION: Primary | ICD-10-CM

## 2018-11-12 DIAGNOSIS — I49.9 IRREGULAR HEART RHYTHM: ICD-10-CM

## 2018-11-12 DIAGNOSIS — M06.9 RHEUMATOID ARTHRITIS, INVOLVING UNSPECIFIED SITE, UNSPECIFIED RHEUMATOID FACTOR PRESENCE: ICD-10-CM

## 2018-11-12 PROCEDURE — G0463 HOSPITAL OUTPT CLINIC VISIT: HCPCS | Performed by: INTERNAL MEDICINE

## 2018-11-12 PROCEDURE — 99213 OFFICE O/P EST LOW 20 MIN: CPT | Performed by: INTERNAL MEDICINE

## 2018-11-12 NOTE — PATIENT INSTRUCTIONS
Reid Juan     good to see you     ekg tomorrow      I will call you     shingles shot    Good job on labs    See me may     call for labs prior

## 2018-11-13 ENCOUNTER — APPOINTMENT (OUTPATIENT)
Dept: LAB | Age: 78
End: 2018-11-13
Attending: INTERNAL MEDICINE
Payer: MEDICARE

## 2018-11-13 DIAGNOSIS — I49.9 IRREGULAR HEART RHYTHM: ICD-10-CM

## 2018-11-13 DIAGNOSIS — I10 ESSENTIAL HYPERTENSION: ICD-10-CM

## 2018-11-13 DIAGNOSIS — M06.9 RHEUMATOID ARTHRITIS, INVOLVING UNSPECIFIED SITE, UNSPECIFIED RHEUMATOID FACTOR PRESENCE: ICD-10-CM

## 2018-11-13 PROCEDURE — 93005 ELECTROCARDIOGRAM TRACING: CPT

## 2018-11-13 PROCEDURE — 93010 ELECTROCARDIOGRAM REPORT: CPT | Performed by: INTERNAL MEDICINE

## 2018-11-13 NOTE — PROGRESS NOTES
Sarah Bey is here and she is doing fine. Her blood pressure is running slightly high today at 154/78 but is home is in the 120/80 range. She has rheumatoid arthritis and osteoporosis.   She saw Dr. Arthur Astudillo recently and everything was fine during her arthriti

## 2018-11-15 RX ORDER — AMLODIPINE BESYLATE 2.5 MG/1
2.5 TABLET ORAL DAILY
Qty: 90 TABLET | Refills: 0 | OUTPATIENT
Start: 2018-11-15

## 2019-01-22 RX ORDER — AMLODIPINE BESYLATE 2.5 MG/1
TABLET ORAL
Qty: 90 TABLET | Refills: 0 | OUTPATIENT
Start: 2019-01-22

## 2019-01-22 NOTE — TELEPHONE ENCOUNTER
Rangel Tellez,  Can you refill Imani's amlodipine 2.5 mg daily if appropriate?   Thanks,  Isrrael Moore

## 2019-01-23 ENCOUNTER — TELEPHONE (OUTPATIENT)
Dept: NEPHROLOGY | Facility: CLINIC | Age: 79
End: 2019-01-23

## 2019-01-23 RX ORDER — AMLODIPINE BESYLATE 2.5 MG/1
2.5 TABLET ORAL DAILY
Qty: 90 TABLET | Refills: 3 | Status: SHIPPED | OUTPATIENT
Start: 2019-01-23 | End: 2019-10-11

## 2019-02-28 VITALS
RESPIRATION RATE: 16 BRPM | OXYGEN SATURATION: 98 % | WEIGHT: 91 LBS | DIASTOLIC BLOOD PRESSURE: 70 MMHG | SYSTOLIC BLOOD PRESSURE: 126 MMHG | BODY MASS INDEX: 16.12 KG/M2 | HEIGHT: 63 IN | HEART RATE: 69 BPM

## 2019-02-28 VITALS
SYSTOLIC BLOOD PRESSURE: 178 MMHG | BODY MASS INDEX: 17.01 KG/M2 | HEIGHT: 63 IN | DIASTOLIC BLOOD PRESSURE: 80 MMHG | WEIGHT: 96 LBS | HEART RATE: 60 BPM

## 2019-03-01 VITALS
DIASTOLIC BLOOD PRESSURE: 82 MMHG | WEIGHT: 89 LBS | HEIGHT: 65 IN | BODY MASS INDEX: 14.83 KG/M2 | OXYGEN SATURATION: 94 % | HEART RATE: 54 BPM | SYSTOLIC BLOOD PRESSURE: 142 MMHG

## 2019-03-01 VITALS
SYSTOLIC BLOOD PRESSURE: 124 MMHG | WEIGHT: 92 LBS | BODY MASS INDEX: 15.33 KG/M2 | RESPIRATION RATE: 8 BRPM | DIASTOLIC BLOOD PRESSURE: 83 MMHG | HEART RATE: 60 BPM | HEIGHT: 65 IN

## 2019-03-01 RX ORDER — HYDROXYCHLOROQUINE SULFATE 200 MG/1
TABLET, FILM COATED ORAL
Qty: 180 TABLET | Refills: 1 | Status: SHIPPED | OUTPATIENT
Start: 2019-03-01 | End: 2020-03-12

## 2019-03-01 NOTE — TELEPHONE ENCOUNTER
Requested medication: HYDROXYCHLOROQUINE SULFATE 200 MG Oral Tab  Last refilled:  10/10/18  #180 tablet with 1 refill  LOV: 9/11/18  Future Appointments   Date Time Provider Agapito Perez   3/12/2019 11:00 AM Estefania Kong MD [facility] Glucose      70 - 99 mg/dL    Sodium      136 - 144 mmol/L    Potassium      3.3 - 5.1 mmol/L    Chloride      95 - 110 mmol/L    Carbon Dioxide, Total      22 - 32 mmol/L    BUN      8 - 20 mg/dL    CREATININE      0.50 - 1.50 mg/dL    CALCIUM      8.5 thrombocytopenia will be followed closely. There isn't an indication for immunosuppressives at this point. Labs will be done today, including an SPEP. I will keep her on hydroxychloroquine 400 mg a day. She has tolerated it very well.  She'll return in 6 mo

## 2019-03-11 NOTE — PROGRESS NOTES
Sher Peter is a 77year old woman with many year history of autoimmune disorder, with RA, Feltys, Sicca. She feels that she has done well since I last saw her September 11th of 2018.  She has severe end-stage joint destruction, but denies increased swelling in t ASSESSMENT/PLAN:     1. Autoimmune disorder, which clinically has behaved as severely destructive rheumatoid arthritis. Her CCP antibody is positive. Her rheumatoid factor is usually negative.  She also has had RONNIE in a homogeneous pattern, low C3 and C4

## 2019-03-12 ENCOUNTER — LAB ENCOUNTER (OUTPATIENT)
Dept: LAB | Age: 79
End: 2019-03-12
Attending: INTERNAL MEDICINE
Payer: MEDICARE

## 2019-03-12 ENCOUNTER — OFFICE VISIT (OUTPATIENT)
Dept: RHEUMATOLOGY | Facility: CLINIC | Age: 79
End: 2019-03-12
Payer: MEDICARE

## 2019-03-12 VITALS
SYSTOLIC BLOOD PRESSURE: 156 MMHG | BODY MASS INDEX: 17 KG/M2 | DIASTOLIC BLOOD PRESSURE: 67 MMHG | HEART RATE: 74 BPM | HEIGHT: 65 IN

## 2019-03-12 DIAGNOSIS — M35.01 SJOGREN'S SYNDROME WITH KERATOCONJUNCTIVITIS SICCA (HCC): ICD-10-CM

## 2019-03-12 DIAGNOSIS — M06.09 SERONEGATIVE RHEUMATOID ARTHRITIS OF MULTIPLE SITES (HCC): ICD-10-CM

## 2019-03-12 DIAGNOSIS — M06.09 SERONEGATIVE RHEUMATOID ARTHRITIS OF MULTIPLE SITES (HCC): Primary | ICD-10-CM

## 2019-03-12 LAB
ALBUMIN SERPL-MCNC: 3.3 G/DL (ref 3.4–5)
ALBUMIN/GLOB SERPL: 0.6 {RATIO} (ref 1–2)
ALP LIVER SERPL-CCNC: 148 U/L (ref 55–142)
ALT SERPL-CCNC: 10 U/L (ref 13–56)
ANION GAP SERPL CALC-SCNC: 6 MMOL/L (ref 0–18)
AST SERPL-CCNC: 27 U/L (ref 15–37)
BILIRUB SERPL-MCNC: 0.7 MG/DL (ref 0.1–2)
BUN BLD-MCNC: 11 MG/DL (ref 7–18)
BUN/CREAT SERPL: 17.2 (ref 10–20)
CALCIUM BLD-MCNC: 9.2 MG/DL (ref 8.5–10.1)
CHLORIDE SERPL-SCNC: 97 MMOL/L (ref 98–107)
CO2 SERPL-SCNC: 27 MMOL/L (ref 21–32)
CREAT BLD-MCNC: 0.64 MG/DL (ref 0.55–1.02)
DEPRECATED RDW RBC AUTO: 44.2 FL (ref 35.1–46.3)
ERYTHROCYTE [DISTWIDTH] IN BLOOD BY AUTOMATED COUNT: 14.6 % (ref 11–15)
GLOBULIN PLAS-MCNC: 5.3 G/DL (ref 2.8–4.4)
GLUCOSE BLD-MCNC: 86 MG/DL (ref 70–99)
HCT VFR BLD AUTO: 37 % (ref 35–48)
HGB BLD-MCNC: 12.6 G/DL (ref 12–16)
M PROTEIN MFR SERPL ELPH: 8.6 G/DL (ref 6.4–8.2)
MCH RBC QN AUTO: 28.5 PG (ref 26–34)
MCHC RBC AUTO-ENTMCNC: 34.1 G/DL (ref 31–37)
MCV RBC AUTO: 83.7 FL (ref 80–100)
OSMOLALITY SERPL CALC.SUM OF ELEC: 269 MOSM/KG (ref 275–295)
PLATELET # BLD AUTO: 88 10(3)UL (ref 150–450)
POTASSIUM SERPL-SCNC: 3.8 MMOL/L (ref 3.5–5.1)
RBC # BLD AUTO: 4.42 X10(6)UL (ref 3.8–5.3)
SODIUM SERPL-SCNC: 130 MMOL/L (ref 136–145)
WBC # BLD AUTO: 1.3 X10(3) UL (ref 4–11)

## 2019-03-12 PROCEDURE — 36415 COLL VENOUS BLD VENIPUNCTURE: CPT

## 2019-03-12 PROCEDURE — 80053 COMPREHEN METABOLIC PANEL: CPT

## 2019-03-12 PROCEDURE — 85027 COMPLETE CBC AUTOMATED: CPT

## 2019-03-12 PROCEDURE — 84165 PROTEIN E-PHORESIS SERUM: CPT

## 2019-03-12 PROCEDURE — G0463 HOSPITAL OUTPT CLINIC VISIT: HCPCS | Performed by: INTERNAL MEDICINE

## 2019-03-12 PROCEDURE — 99213 OFFICE O/P EST LOW 20 MIN: CPT | Performed by: INTERNAL MEDICINE

## 2019-03-15 LAB
ALBUMIN SERPL ELPH-MCNC: 3.52 G/DL (ref 3.75–5.21)
ALBUMIN/GLOB SERPL: 0.8 {RATIO} (ref 1–2)
ALPHA1 GLOB SERPL ELPH-MCNC: 0.36 G/DL (ref 0.19–0.46)
ALPHA2 GLOB SERPL ELPH-MCNC: 0.66 G/DL (ref 0.48–1.05)
B-GLOBULIN SERPL ELPH-MCNC: 1 G/DL (ref 0.68–1.23)
GAMMA GLOB SERPL ELPH-MCNC: 2.36 G/DL (ref 0.62–1.7)
TOTAL PROTEIN (SPECIAL TESTING): 7.9 G/DL (ref 6.5–9.1)

## 2019-03-19 ENCOUNTER — TELEPHONE (OUTPATIENT)
Dept: RHEUMATOLOGY | Facility: CLINIC | Age: 79
End: 2019-03-19

## 2019-03-19 DIAGNOSIS — D72.819 LEUKOPENIA, UNSPECIFIED TYPE: ICD-10-CM

## 2019-03-19 DIAGNOSIS — D69.6 THROMBOCYTOPENIA (HCC): Primary | ICD-10-CM

## 2019-03-19 NOTE — TELEPHONE ENCOUNTER
I called Janet Crew with the results of her labs from last week. CMP and SPEP unchanged. She feels fine. No infection. Her WBC was the lowest it has been , at 1,300. Her platelet count is lower than it has been, at 88,000.     Hopefully this is a fluke

## 2019-03-20 ENCOUNTER — LAB ENCOUNTER (OUTPATIENT)
Dept: LAB | Age: 79
End: 2019-03-20
Attending: INTERNAL MEDICINE
Payer: MEDICARE

## 2019-03-20 ENCOUNTER — TELEPHONE (OUTPATIENT)
Dept: RHEUMATOLOGY | Facility: CLINIC | Age: 79
End: 2019-03-20

## 2019-03-20 DIAGNOSIS — D72.819 LEUKOPENIA, UNSPECIFIED TYPE: ICD-10-CM

## 2019-03-20 DIAGNOSIS — D69.6 THROMBOCYTOPENIA (HCC): ICD-10-CM

## 2019-03-20 DIAGNOSIS — D70.9 NEUTROPENIA, UNSPECIFIED TYPE (HCC): Primary | ICD-10-CM

## 2019-03-20 LAB
BASOPHILS # BLD AUTO: 0.01 X10(3) UL (ref 0–0.2)
BASOPHILS NFR BLD AUTO: 0.6 %
DEPRECATED RDW RBC AUTO: 45 FL (ref 35.1–46.3)
EOSINOPHIL # BLD AUTO: 0.02 X10(3) UL (ref 0–0.7)
EOSINOPHIL NFR BLD AUTO: 1.3 %
ERYTHROCYTE [DISTWIDTH] IN BLOOD BY AUTOMATED COUNT: 14.6 % (ref 11–15)
HCT VFR BLD AUTO: 35.6 % (ref 35–48)
HGB BLD-MCNC: 11.7 G/DL (ref 12–16)
IMM GRANULOCYTES # BLD AUTO: 0 X10(3) UL (ref 0–1)
IMM GRANULOCYTES NFR BLD: 0 %
LYMPHOCYTES # BLD AUTO: 0.97 X10(3) UL (ref 1–4)
LYMPHOCYTES NFR BLD AUTO: 61.8 %
MCH RBC QN AUTO: 28.1 PG (ref 26–34)
MCHC RBC AUTO-ENTMCNC: 32.9 G/DL (ref 31–37)
MCV RBC AUTO: 85.4 FL (ref 80–100)
MONOCYTES # BLD AUTO: 0.28 X10(3) UL (ref 0.1–1)
MONOCYTES NFR BLD AUTO: 17.8 %
NEUTROPHILS # BLD AUTO: 0.29 X10 (3) UL (ref 1.5–7.7)
NEUTROPHILS # BLD AUTO: 0.29 X10(3) UL (ref 1.5–7.7)
NEUTROPHILS NFR BLD AUTO: 18.5 %
PLATELET # BLD AUTO: 88 10(3)UL (ref 150–450)
RBC # BLD AUTO: 4.17 X10(6)UL (ref 3.8–5.3)
WBC # BLD AUTO: 1.6 X10(3) UL (ref 4–11)

## 2019-03-20 PROCEDURE — 85025 COMPLETE CBC W/AUTO DIFF WBC: CPT

## 2019-03-20 PROCEDURE — 85060 BLOOD SMEAR INTERPRETATION: CPT

## 2019-03-20 PROCEDURE — 36415 COLL VENOUS BLD VENIPUNCTURE: CPT

## 2019-03-20 NOTE — TELEPHONE ENCOUNTER
[address] [facility] lab called to inform of critical lab result for absolute neutrophils.      Dr. Paty Magana- please see below and advise:       Component      Latest Ref Rng & Units 3/20/2019   Prelim Neutrophil Abs      1.50 - 7.70 x10 (3) uL 0.29 (LL)   Neutrophils Abso

## 2019-03-21 NOTE — TELEPHONE ENCOUNTER
Contacted pt and provided her with [facility] phone number (t:[phone]). Pt aware Dr. Sinai Cervantes has ordered an ultrasound of sleen - provided pt with Central Scheduling phone number ([phone]).  Pt declined trans

## 2019-03-21 NOTE — TELEPHONE ENCOUNTER
I called Mare Skinner. She continues to feel fine. Her white count remains very low. Her PMNs are critically low. Her platelets remains lower. Please help her get appointment in Hematology. Bone marrow Bx may need to be done to r/o LGL leukemia.     I also

## 2019-04-24 ENCOUNTER — TELEPHONE (OUTPATIENT)
Dept: NEPHROLOGY | Facility: CLINIC | Age: 79
End: 2019-04-24

## 2019-04-24 DIAGNOSIS — Z01.89 ROUTINE LAB DRAW: Primary | ICD-10-CM

## 2019-05-06 ENCOUNTER — APPOINTMENT (OUTPATIENT)
Dept: LAB | Age: 79
End: 2019-05-06
Attending: INTERNAL MEDICINE
Payer: MEDICARE

## 2019-05-06 DIAGNOSIS — Z01.89 ROUTINE LAB DRAW: ICD-10-CM

## 2019-05-06 PROCEDURE — 36415 COLL VENOUS BLD VENIPUNCTURE: CPT

## 2019-05-06 PROCEDURE — 84443 ASSAY THYROID STIM HORMONE: CPT

## 2019-05-08 NOTE — DISCHARGE SUMMARY
[facility] HOSPITALIST  DISCHARGE SUMMARY     Derl Mech Patient Status:  Inpatient    3/21/1940 MRN [id number]   Location [address] [facility] Attending No att. providers found   Hosp Day # 4 PCP Markus Mcdonald MD     Date of Admission: 5/15/2017 anticoagulated    Fever on 5/16 (which pt attributes to too many blankets)  Abn UA, but no dysuria  Possible UTI, urine culture suggestive of contamination  -empiric abx while waiting for cx b/c plans for cardiac procedure -->dc now    Pancytopenia, mild Cramping. Quantity:  30 tablet   Refills:  2       LUTEIN OR        Take 6 mg by mouth daily. Refills:  0       TYLENOL 325 MG Tabs   Generic drug:  acetaminophen        Take 325 mg by mouth every 6 (six) hours as needed for Pain.     Refills:  0 a member of the Care Management Team.  For all other patients, please follow usual protocol for discharge care transition.     Lace+ Score: 67  59-90 High Risk  29-58 Medium Risk  0-28 Low Risk    Risk of readmission: Stuart Ray has High Risk of readmis left

## 2019-05-10 ENCOUNTER — TELEPHONE (OUTPATIENT)
Dept: NEPHROLOGY | Facility: CLINIC | Age: 79
End: 2019-05-10

## 2019-05-10 ENCOUNTER — OFFICE VISIT (OUTPATIENT)
Dept: NEPHROLOGY | Facility: CLINIC | Age: 79
End: 2019-05-10
Payer: MEDICARE

## 2019-05-10 VITALS
HEIGHT: 65 IN | BODY MASS INDEX: 15.33 KG/M2 | WEIGHT: 92 LBS | HEART RATE: 96 BPM | SYSTOLIC BLOOD PRESSURE: 136 MMHG | DIASTOLIC BLOOD PRESSURE: 83 MMHG

## 2019-05-10 DIAGNOSIS — Z86.79 HISTORY OF ATRIAL FIBRILLATION: ICD-10-CM

## 2019-05-10 DIAGNOSIS — I10 HTN (HYPERTENSION), BENIGN: Chronic | ICD-10-CM

## 2019-05-10 DIAGNOSIS — M06.09 SERONEGATIVE RHEUMATOID ARTHRITIS OF MULTIPLE SITES (HCC): ICD-10-CM

## 2019-05-10 DIAGNOSIS — M81.0 AGE-RELATED OSTEOPOROSIS WITHOUT CURRENT PATHOLOGICAL FRACTURE: Primary | ICD-10-CM

## 2019-05-10 DIAGNOSIS — I48.0 PAROXYSMAL ATRIAL FIBRILLATION (HCC): Primary | ICD-10-CM

## 2019-05-10 PROCEDURE — G0463 HOSPITAL OUTPT CLINIC VISIT: HCPCS | Performed by: INTERNAL MEDICINE

## 2019-05-10 PROCEDURE — 99213 OFFICE O/P EST LOW 20 MIN: CPT | Performed by: INTERNAL MEDICINE

## 2019-05-10 NOTE — PROGRESS NOTES
Mylene Keven is here she is doing fine did have a cough but it seems to be clearing up no color change in her phlegm  She is breathing fine blood pressure is doing well at home and she is up-to-date with Dr. Warner Merlin in the eye doctor.   She has lost some more vianey

## 2019-05-10 NOTE — PATIENT INSTRUCTIONS
Please do ekg at [address] [facility] eating well    See me august    Happy mothers day Rebecca Cartagena

## 2019-05-10 NOTE — TELEPHONE ENCOUNTER
Order entered in [address] [place] [address] Rd. LMTCB to confirm that patient got this message or if she has any questions or concerns. Scheduling phone# left on .

## 2019-05-22 ENCOUNTER — APPOINTMENT (OUTPATIENT)
Dept: LAB | Age: 79
End: 2019-05-22
Attending: INTERNAL MEDICINE
Payer: MEDICARE

## 2019-05-22 DIAGNOSIS — I48.0 PAROXYSMAL ATRIAL FIBRILLATION (HCC): ICD-10-CM

## 2019-05-22 PROCEDURE — 93005 ELECTROCARDIOGRAM TRACING: CPT

## 2019-05-22 PROCEDURE — 93010 ELECTROCARDIOGRAM REPORT: CPT | Performed by: INTERNAL MEDICINE

## 2019-05-23 ENCOUNTER — TELEPHONE (OUTPATIENT)
Dept: NEPHROLOGY | Facility: CLINIC | Age: 79
End: 2019-05-23

## 2019-05-23 NOTE — TELEPHONE ENCOUNTER
Patient contacted and relayed Dr. Edith Mc advice. Patient states she will do as Dr. Ella Messina advised. Routed to Dr. Ella Messina to sign.

## 2019-05-23 NOTE — TELEPHONE ENCOUNTER
Spoke to Dr. Warren Elliott. He would like patient to be evaluated in the ER for abnormal EKG/atrial flutter. Dr. Warren Elliott said he tried to get patient a Cardiology appointment for today but nothing was available. Patient is having palpitations.

## 2019-05-24 ENCOUNTER — HOSPITAL ENCOUNTER (OUTPATIENT)
Dept: ULTRASOUND IMAGING | Age: 79
Discharge: HOME | End: 2019-05-24
Attending: INTERNAL MEDICINE
Payer: MEDICARE

## 2019-05-24 ENCOUNTER — HOSPITAL ENCOUNTER (OUTPATIENT)
Dept: BONE DENSITY | Age: 79
Discharge: HOME | End: 2019-05-24
Attending: INTERNAL MEDICINE
Payer: MEDICARE

## 2019-05-24 ENCOUNTER — HOSPITAL ENCOUNTER (EMERGENCY)
Facility: HOSPITAL | Age: 79
Discharge: HOME | End: 2019-05-24
Attending: EMERGENCY MEDICINE
Payer: MEDICARE

## 2019-05-24 VITALS
SYSTOLIC BLOOD PRESSURE: 135 MMHG | OXYGEN SATURATION: 99 % | HEIGHT: 62 IN | RESPIRATION RATE: 17 BRPM | DIASTOLIC BLOOD PRESSURE: 68 MMHG | BODY MASS INDEX: 16.56 KG/M2 | HEART RATE: 91 BPM | TEMPERATURE: 99 F | WEIGHT: 90 LBS

## 2019-05-24 DIAGNOSIS — D69.6 THROMBOCYTOPENIA (HCC): ICD-10-CM

## 2019-05-24 DIAGNOSIS — R94.31 ABNORMAL EKG: Primary | ICD-10-CM

## 2019-05-24 DIAGNOSIS — M81.0 AGE-RELATED OSTEOPOROSIS WITHOUT CURRENT PATHOLOGICAL FRACTURE: ICD-10-CM

## 2019-05-24 DIAGNOSIS — D70.9 NEUTROPENIA, UNSPECIFIED TYPE (HCC): ICD-10-CM

## 2019-05-24 PROCEDURE — 76705 ECHO EXAM OF ABDOMEN: CPT | Performed by: INTERNAL MEDICINE

## 2019-05-24 PROCEDURE — 93005 ELECTROCARDIOGRAM TRACING: CPT

## 2019-05-24 PROCEDURE — 93010 ELECTROCARDIOGRAM REPORT: CPT | Performed by: EMERGENCY MEDICINE

## 2019-05-24 PROCEDURE — 99284 EMERGENCY DEPT VISIT MOD MDM: CPT

## 2019-05-24 PROCEDURE — 77080 DXA BONE DENSITY AXIAL: CPT | Performed by: INTERNAL MEDICINE

## 2019-05-24 NOTE — ED NOTES
Patient cleared for discharge by MD. Berger with patient. Patient discharge instructions reviewed with patient including when and how to follow up  with healthcare provider and when to seek medical treatment.

## 2019-05-24 NOTE — ED INITIAL ASSESSMENT (HPI)
Pt sent to ED by PMD for abnormal EKG, possible atrial flutter. Pt reports history of atrial flutter with ablation.

## 2019-05-26 NOTE — ED PROVIDER NOTES
Patient Seen in: [facility] AND [facility] Emergency Department    History   Patient presents with:  Abnormal Result (metabolic, cardiac)      HPI    Patient presents to the ED after an outpatient EKG done several days ago showed abnormalities.   The patient stat defibrillator: No        Reaction to local anesthetic: No      ROS  Pertinent Positives: No complaints  All other organ systems are reviewed and are negative. Constitutional and vital signs reviewed.       Social History and Family History elements revie Review: I personally reviewed available prior medical records for any recent pertinent discharge summaries, testing, and procedures and reviewed those reports. Complicating Factors: The patient already has does not have any pertinent problems on file.  t

## 2019-06-13 ENCOUNTER — TELEPHONE (OUTPATIENT)
Dept: RHEUMATOLOGY | Facility: CLINIC | Age: 79
End: 2019-06-13

## 2019-06-13 DIAGNOSIS — D70.9 NEUTROPENIA, UNSPECIFIED TYPE (HCC): Primary | ICD-10-CM

## 2019-06-13 NOTE — TELEPHONE ENCOUNTER
I called Sohail King with her spleen U/S results. She has mild splenomegaly. She feels fine. No infections. Repeat CBC with diff ordered. Her f/u with me is scheduled September 10th.

## 2019-08-12 ENCOUNTER — TELEPHONE (OUTPATIENT)
Dept: NEPHROLOGY | Facility: CLINIC | Age: 79
End: 2019-08-12

## 2019-08-12 DIAGNOSIS — I10 ESSENTIAL HYPERTENSION: Primary | ICD-10-CM

## 2019-08-12 NOTE — TELEPHONE ENCOUNTER
Pts daughter would like lab orders mailed to pts home address. Pt would like to have labs done at an outside facility. Verified address is correct.

## 2019-08-24 ENCOUNTER — TELEPHONE (OUTPATIENT)
Dept: NEPHROLOGY | Facility: CLINIC | Age: 79
End: 2019-08-24

## 2019-08-24 NOTE — TELEPHONE ENCOUNTER
Received a call from [facility] about WBC 1.8 and absolute neutrophil count 300. plt around 90,000.      CBC from march noted - wbc 1.6 with neutropenia     Dr. Michelle Wells to address on Monday

## 2019-09-30 ENCOUNTER — TELEPHONE (OUTPATIENT)
Dept: NEPHROLOGY | Facility: CLINIC | Age: 79
End: 2019-09-30

## 2019-09-30 RX ORDER — METOPROLOL TARTRATE 50 MG/1
50 TABLET, FILM COATED ORAL 2 TIMES DAILY
Qty: 180 TABLET | Refills: 3 | Status: SHIPPED
Start: 2019-09-30 | End: 2019-10-12

## 2019-09-30 NOTE — TELEPHONE ENCOUNTER
Spoke to patient's daughter Daphney Avila and offered patient an appointment with Dr. Frank Phillips today but unfortunatly daughter works until 4:30 and patient lives in [place] so they are unable to get here.  She is ok to wait until Dr. Steven Quiñonez returns but I did adv

## 2019-09-30 NOTE — TELEPHONE ENCOUNTER
Refill approved 1 time per written protocol in Dr. Enma Mccray absence from the office until 10/7/19.

## 2019-09-30 NOTE — TELEPHONE ENCOUNTER
Pt daugh concerned that pt. Has had diarrhea for the past 3 weeks, and pt.has lost about 7 lbs, and pt. Only weighs 83lbs. With clothes on. Graciela Baltazar wants to know if pt. Can be seen right away. Graciela Baltazar was informed that Dr Luis Felipe Andres is out of the office this week.

## 2019-10-09 ENCOUNTER — TELEPHONE (OUTPATIENT)
Dept: NEPHROLOGY | Facility: CLINIC | Age: 79
End: 2019-10-09

## 2019-10-09 NOTE — TELEPHONE ENCOUNTER
LULA Rausch      Received the following notification that patient has stopped her hydroxychlorquine for the following reason:    Medications Start Date Reported By  Comments   Hydroxychloroquine Sulfate 200 MG Tabs 3/1/2019 Adalberto Proper new  do

## 2019-10-09 NOTE — TELEPHONE ENCOUNTER
Called Francisca Encarnacion and informed her of [facility] message below. She verbalized understanding. Tasked to  to book appt.

## 2019-10-09 NOTE — TELEPHONE ENCOUNTER
Dr. Aliza Bassett please advise. Spoke with daughter Hung Sauceda. She is out of town on Friday Oct 11 and cannot bring her mother in. Wondering if they can see you this coming Monday? If so, what time? Thank you.

## 2019-10-11 RX ORDER — AMLODIPINE BESYLATE 2.5 MG/1
2.5 TABLET ORAL DAILY
Qty: 90 TABLET | Refills: 3 | Status: SHIPPED | OUTPATIENT
Start: 2019-10-11 | End: 2019-10-14

## 2019-10-14 ENCOUNTER — OFFICE VISIT (OUTPATIENT)
Dept: NEPHROLOGY | Facility: CLINIC | Age: 79
End: 2019-10-14
Payer: MEDICARE

## 2019-10-14 ENCOUNTER — LAB ENCOUNTER (OUTPATIENT)
Dept: LAB | Facility: HOSPITAL | Age: 79
End: 2019-10-14
Attending: INTERNAL MEDICINE
Payer: MEDICARE

## 2019-10-14 VITALS
HEART RATE: 118 BPM | WEIGHT: 83 LBS | RESPIRATION RATE: 17 BRPM | HEIGHT: 66 IN | DIASTOLIC BLOOD PRESSURE: 79 MMHG | BODY MASS INDEX: 13.34 KG/M2 | TEMPERATURE: 98 F | SYSTOLIC BLOOD PRESSURE: 129 MMHG

## 2019-10-14 DIAGNOSIS — D50.9 IRON DEFICIENCY ANEMIA, UNSPECIFIED IRON DEFICIENCY ANEMIA TYPE: ICD-10-CM

## 2019-10-14 DIAGNOSIS — I10 HTN (HYPERTENSION), BENIGN: Chronic | ICD-10-CM

## 2019-10-14 DIAGNOSIS — R53.83 FATIGUE, UNSPECIFIED TYPE: ICD-10-CM

## 2019-10-14 DIAGNOSIS — R53.83 FATIGUE, UNSPECIFIED TYPE: Primary | ICD-10-CM

## 2019-10-14 DIAGNOSIS — R63.4 WEIGHT LOSS: ICD-10-CM

## 2019-10-14 DIAGNOSIS — I10 ESSENTIAL HYPERTENSION: ICD-10-CM

## 2019-10-14 DIAGNOSIS — M06.09 SERONEGATIVE RHEUMATOID ARTHRITIS OF MULTIPLE SITES (HCC): ICD-10-CM

## 2019-10-14 PROCEDURE — 84466 ASSAY OF TRANSFERRIN: CPT

## 2019-10-14 PROCEDURE — 36415 COLL VENOUS BLD VENIPUNCTURE: CPT

## 2019-10-14 PROCEDURE — 85652 RBC SED RATE AUTOMATED: CPT

## 2019-10-14 PROCEDURE — 82607 VITAMIN B-12: CPT

## 2019-10-14 PROCEDURE — 86140 C-REACTIVE PROTEIN: CPT

## 2019-10-14 PROCEDURE — 81001 URINALYSIS AUTO W/SCOPE: CPT

## 2019-10-14 PROCEDURE — G0463 HOSPITAL OUTPT CLINIC VISIT: HCPCS | Performed by: INTERNAL MEDICINE

## 2019-10-14 PROCEDURE — 85025 COMPLETE CBC W/AUTO DIFF WBC: CPT

## 2019-10-14 PROCEDURE — 85060 BLOOD SMEAR INTERPRETATION: CPT

## 2019-10-14 PROCEDURE — 80053 COMPREHEN METABOLIC PANEL: CPT

## 2019-10-14 PROCEDURE — 83540 ASSAY OF IRON: CPT

## 2019-10-14 PROCEDURE — 99214 OFFICE O/P EST MOD 30 MIN: CPT | Performed by: INTERNAL MEDICINE

## 2019-10-14 RX ORDER — METOPROLOL TARTRATE 50 MG/1
50 TABLET, FILM COATED ORAL 2 TIMES DAILY
Qty: 28 TABLET | Refills: 1 | Status: SHIPPED | OUTPATIENT
Start: 2019-10-14 | End: 2020-02-27

## 2019-10-14 RX ORDER — METOPROLOL TARTRATE 50 MG/1
50 TABLET, FILM COATED ORAL 2 TIMES DAILY
Qty: 180 TABLET | Refills: 1 | Status: SHIPPED | OUTPATIENT
Start: 2019-10-14 | End: 2019-10-14

## 2019-10-14 NOTE — PATIENT INSTRUCTIONS
Please do labs today    Call me in about 2 days for results try a milkshake with whole milk    Strawberry or vanilla ice cream fresh cut strawberries and protein powder equal to about 40 g.   Try to buy a powder with high calories Nexium altogether once a d

## 2019-10-14 NOTE — TELEPHONE ENCOUNTER
LOV 5/10/19, labs in March per other physician. Follow up scheduled today.   [facility] please advise on refill request.

## 2019-10-14 NOTE — PROGRESS NOTES
Cielo Honeycutt is here with her daughter Jenna Lara her weight is down about 20% gone from about 99 to 83 pounds she is been eating less she says her stomach is small and her ribs are encroaching on her stomach and she does have kyphosis.     She has occasional diarrhea b

## 2019-10-16 ENCOUNTER — TELEPHONE (OUTPATIENT)
Dept: NEPHROLOGY | Facility: CLINIC | Age: 79
End: 2019-10-16

## 2019-10-16 NOTE — TELEPHONE ENCOUNTER
Patient's daughter Mary Anne Hernandez called to get the results of the blood work. Please call Maddi Hu at [phone].

## 2019-11-10 NOTE — PROGRESS NOTES
Baron Pennington is a 77year old woman with autoimmune disorder. She continues to feel that she's been doing well. She had severe joint destruction in the past, but denies increased swelling in the morning or significant stiffness in recent years. She's had no rash. PROBIOTIC, LACTOBACILLUS,) Oral Cap Take 1 tablet by mouth nightly. Disp:  Rfl:    loratadine (CLARITIN) 10 MG Oral Tab Take 10 mg by mouth as needed for Allergies. Disp:  Rfl:    vitamin E 400 UNITS Oral Cap Take 400 Units by mouth daily.  Disp:  Rfl:    V in 6 months. 2. Osteoporosis on alendronate holiday, calcium, and vitamin D.   3. History of right breast cancer, without activity. Walk in

## 2020-01-30 ENCOUNTER — TELEPHONE (OUTPATIENT)
Dept: NEPHROLOGY | Facility: CLINIC | Age: 80
End: 2020-01-30

## 2020-02-27 RX ORDER — METOPROLOL TARTRATE 50 MG/1
TABLET, FILM COATED ORAL
Qty: 180 TABLET | Refills: 3 | Status: SHIPPED | OUTPATIENT
Start: 2020-02-27 | End: 2020-03-11

## 2020-03-10 PROBLEM — K91.5 POST-CHOLECYSTECTOMY SYNDROME: Status: ACTIVE | Noted: 2020-03-10

## 2020-03-10 PROBLEM — R10.13 EPIGASTRIC PAIN: Status: ACTIVE | Noted: 2020-03-10

## 2020-03-10 PROBLEM — Z86.010 HISTORY OF ADENOMATOUS POLYP OF COLON: Status: ACTIVE | Noted: 2020-03-10

## 2020-03-10 PROBLEM — R63.4 WEIGHT LOSS: Status: ACTIVE | Noted: 2020-03-10

## 2020-03-11 RX ORDER — METOPROLOL TARTRATE 50 MG/1
50 TABLET, FILM COATED ORAL 2 TIMES DAILY
Qty: 180 TABLET | Refills: 3 | Status: SHIPPED | OUTPATIENT
Start: 2020-03-11 | End: 2020-06-15

## 2020-06-15 RX ORDER — METOPROLOL TARTRATE 50 MG/1
50 TABLET, FILM COATED ORAL 2 TIMES DAILY
Qty: 180 TABLET | Refills: 0 | Status: SHIPPED | OUTPATIENT
Start: 2020-06-15 | End: 2020-08-03

## 2020-06-15 NOTE — TELEPHONE ENCOUNTER
Metoprolol Tartrate 50 mg rx request. Please review and sign off if appropriate.      Last office visit: 10/14/19  Future appt 7/27/2020  Last Refill: 3/11/2020

## 2020-07-27 ENCOUNTER — OFFICE VISIT (OUTPATIENT)
Dept: NEPHROLOGY | Facility: CLINIC | Age: 80
End: 2020-07-27
Payer: MEDICARE

## 2020-07-27 ENCOUNTER — LAB ENCOUNTER (OUTPATIENT)
Dept: LAB | Facility: HOSPITAL | Age: 80
End: 2020-07-27
Attending: INTERNAL MEDICINE
Payer: MEDICARE

## 2020-07-27 VITALS
HEIGHT: 62 IN | WEIGHT: 83.19 LBS | BODY MASS INDEX: 15.31 KG/M2 | SYSTOLIC BLOOD PRESSURE: 152 MMHG | DIASTOLIC BLOOD PRESSURE: 77 MMHG | HEART RATE: 88 BPM

## 2020-07-27 DIAGNOSIS — M81.0 AGE-RELATED OSTEOPOROSIS WITHOUT CURRENT PATHOLOGICAL FRACTURE: ICD-10-CM

## 2020-07-27 DIAGNOSIS — I10 HTN (HYPERTENSION), BENIGN: ICD-10-CM

## 2020-07-27 DIAGNOSIS — Z00.00 ENCOUNTER FOR ANNUAL HEALTH EXAMINATION: ICD-10-CM

## 2020-07-27 DIAGNOSIS — M06.09 SERONEGATIVE RHEUMATOID ARTHRITIS OF MULTIPLE SITES (HCC): ICD-10-CM

## 2020-07-27 DIAGNOSIS — Z00.00 ROUTINE ADULT HEALTH MAINTENANCE: ICD-10-CM

## 2020-07-27 DIAGNOSIS — I10 HTN (HYPERTENSION), BENIGN: Chronic | ICD-10-CM

## 2020-07-27 DIAGNOSIS — I49.9 CARDIAC ARRHYTHMIA, UNSPECIFIED CARDIAC ARRHYTHMIA TYPE: Primary | ICD-10-CM

## 2020-07-27 DIAGNOSIS — I49.9 CARDIAC ARRHYTHMIA, UNSPECIFIED CARDIAC ARRHYTHMIA TYPE: ICD-10-CM

## 2020-07-27 LAB
CHOLEST SMN-MCNC: 170 MG/DL (ref ?–200)
HDLC SERPL-MCNC: 69 MG/DL (ref 40–59)
LDLC SERPL CALC-MCNC: 81 MG/DL (ref ?–100)
NONHDLC SERPL-MCNC: 101 MG/DL (ref ?–130)
PATIENT FASTING Y/N/NP: NO
TRIGL SERPL-MCNC: 102 MG/DL (ref 30–149)
VLDLC SERPL CALC-MCNC: 20 MG/DL (ref 0–30)

## 2020-07-27 PROCEDURE — 85025 COMPLETE CBC W/AUTO DIFF WBC: CPT

## 2020-07-27 PROCEDURE — 85060 BLOOD SMEAR INTERPRETATION: CPT

## 2020-07-27 PROCEDURE — 80061 LIPID PANEL: CPT

## 2020-07-27 PROCEDURE — G0439 PPPS, SUBSEQ VISIT: HCPCS | Performed by: INTERNAL MEDICINE

## 2020-07-27 PROCEDURE — 36415 COLL VENOUS BLD VENIPUNCTURE: CPT

## 2020-07-27 NOTE — PATIENT INSTRUCTIONS
Good job with your health clementine Cortez shot 2 shots total 3 months apart    Flu shot in the fall      Follow-up with Dr. Christal Evangelista labs today no fast    Follow-up with rheumatologist    I believe you need to go on a medicine for osteoporosis [place] Electrocardiogram date05/24/2019 Routine EKG is not a screening covered service except at the Welcome to Medicare Visit    Abdominal aortic aneurysm screening (once between ages 73-68) IPPE only No results found for this or any previous visit.  Limited to p patient. Chlamydia  Annually if high risk No results found for: CHLAMYDIA No flowsheet data found.     Screening Mammogram      Mammogram    Recommend Annually to at least age 76, and as needed after 76 There are no preventive care reminders to display has the State forms available on it's website for anyone to review and print using their home computer and printer. (the forms are also available in [address])  www. putitinwriting. org  This link also has information from the [address]

## 2020-07-28 ENCOUNTER — TELEPHONE (OUTPATIENT)
Dept: NEPHROLOGY | Facility: CLINIC | Age: 80
End: 2020-07-28

## 2020-07-28 DIAGNOSIS — Z12.31 VISIT FOR SCREENING MAMMOGRAM: Primary | ICD-10-CM

## 2020-07-28 LAB
BASOPHILS # BLD AUTO: 0.01 X10(3) UL (ref 0–0.2)
BASOPHILS NFR BLD AUTO: 0.5 %
DEPRECATED RDW RBC AUTO: 49.2 FL (ref 35.1–46.3)
EOSINOPHIL # BLD AUTO: 0.01 X10(3) UL (ref 0–0.7)
EOSINOPHIL NFR BLD AUTO: 0.5 %
ERYTHROCYTE [DISTWIDTH] IN BLOOD BY AUTOMATED COUNT: 15.8 % (ref 11–15)
HCT VFR BLD AUTO: 38.6 % (ref 35–48)
HGB BLD-MCNC: 12.5 G/DL (ref 12–16)
IMM GRANULOCYTES # BLD AUTO: 0.01 X10(3) UL (ref 0–1)
IMM GRANULOCYTES NFR BLD: 0.5 %
LYMPHOCYTES # BLD AUTO: 0.76 X10(3) UL (ref 1–4)
LYMPHOCYTES NFR BLD AUTO: 38.4 %
MCH RBC QN AUTO: 27.8 PG (ref 26–34)
MCHC RBC AUTO-ENTMCNC: 32.4 G/DL (ref 31–37)
MCV RBC AUTO: 85.8 FL (ref 80–100)
MONOCYTES # BLD AUTO: 0.27 X10(3) UL (ref 0.1–1)
MONOCYTES NFR BLD AUTO: 13.6 %
NEUTROPHILS # BLD AUTO: 0.92 X10 (3) UL (ref 1.5–7.7)
NEUTROPHILS # BLD AUTO: 0.92 X10(3) UL (ref 1.5–7.7)
NEUTROPHILS NFR BLD AUTO: 46.5 %
PLATELET # BLD AUTO: 122 10(3)UL (ref 150–450)
RBC # BLD AUTO: 4.5 X10(6)UL (ref 3.8–5.3)
WBC # BLD AUTO: 2 X10(3) UL (ref 4–11)

## 2020-07-30 NOTE — PROGRESS NOTES
HPI:   Taiwo Strickland is a 79year old female who presents for a Medicare Subsequent Annual Wellness visit (Pt already had Initial Annual Wellness).     Is here for an annual physical she is a new rheumatologist and she has been seeing Dr. Sunil Brush in t scanning into Epic.   Patient filled out forms today and a copy is sent to our scanning department (not required for CPT 41690 and [address])           She has never smoked tobacco.    CAGE Alcohol screening   Stuart Flor was screened for Alcohol abuse and h 06/25/2020    GLU 97 06/25/2020        CBC  (most recent labs)   Lab Results   Component Value Date    WBC 2.0 (L) 07/27/2020    HGB 12.5 07/27/2020    .0 (L) 07/27/2020        ALLERGIES:   She has No Known Allergies.     CURRENT MEDICATIONS:   AMLOD has never used smokeless tobacco. She reports that she does not drink alcohol or use drugs.      REVIEW OF SYSTEMS:      Negative except occasional problems with swallowing and poor appetite    EXAM:   /77 (BP Location: Left arm, Patient Position: Sit understanding things on the TV:  No I have to strain to understand conversations:  No   I have to worry about missing the telephone ring or doorbell:  No I have trouble hearing conversations in a noisy background such as a crowded room or restaurant:  No URINALYSIS, ROUTINE; Future    Seronegative rheumatoid arthritis of multiple sites ([facility] [address]  -     LIPID PANEL; Future  -     CBC WITH DIFFERENTIAL WITH PLATELET;  Future  -     URINALYSIS, ROUTINE; Future    Age-related osteoporosis without current patholo Cancer Screening      Colonoscopy Screen every 10 years There are no preventive care reminders to display for this patient. Update Health Maintenance if applicable    Flex Sigmoidoscopy Screen every 10 years No results found for this or any previous visit. covered with your prescription benefits, but Medicare does not cover unless Medically needed    Zoster  Not covered by Medicare Part B No vaccine history found This may be covered with your pharmacy  prescription benefits                    Template: EMORY VELÁZQUEZ

## 2020-08-03 RX ORDER — METOPROLOL TARTRATE 50 MG/1
TABLET, FILM COATED ORAL
Qty: 180 TABLET | Refills: 3 | Status: SHIPPED | OUTPATIENT
Start: 2020-08-03 | End: 2021-10-12

## 2020-09-08 PROBLEM — D69.6 THROMBOCYTOPENIA (HCC): Status: ACTIVE | Noted: 2020-09-08

## 2020-09-08 PROBLEM — R74.8 ELEVATED ALKALINE PHOSPHATASE LEVEL: Status: ACTIVE | Noted: 2020-09-08

## 2020-09-08 PROBLEM — R16.1 SPLENOMEGALY: Status: ACTIVE | Noted: 2020-09-08

## 2020-09-08 PROBLEM — K22.2 ESOPHAGEAL STRICTURE: Status: ACTIVE | Noted: 2020-09-08

## 2020-09-08 PROBLEM — A04.8 H. PYLORI INFECTION: Status: ACTIVE | Noted: 2020-09-08

## 2020-09-10 ENCOUNTER — HOSPITAL ENCOUNTER (OUTPATIENT)
Dept: MAMMOGRAPHY | Facility: HOSPITAL | Age: 80
Discharge: HOME | End: 2020-09-10
Attending: INTERNAL MEDICINE
Payer: MEDICARE

## 2020-09-10 DIAGNOSIS — Z12.31 VISIT FOR SCREENING MAMMOGRAM: ICD-10-CM

## 2020-09-10 PROCEDURE — 77067 SCR MAMMO BI INCL CAD: CPT | Performed by: INTERNAL MEDICINE

## 2020-09-10 PROCEDURE — 77063 BREAST TOMOSYNTHESIS BI: CPT | Performed by: INTERNAL MEDICINE

## 2020-11-13 ENCOUNTER — TELEPHONE (OUTPATIENT)
Dept: NEPHROLOGY | Facility: CLINIC | Age: 80
End: 2020-11-13

## 2020-11-13 NOTE — TELEPHONE ENCOUNTER
Naif Velazco from [facility] received a order to start seeing the patient for home health care. Pt is currently being discharged. She is wondering if Dr. Juan David Prater is willing to follow for any other orders?  Please follow up thank you

## 2020-11-16 NOTE — TELEPHONE ENCOUNTER
Patient knows what she is taking can take which she wants tell him to stop calling me every day or I will fire him and get a new home health agency

## 2020-11-16 NOTE — TELEPHONE ENCOUNTER
Pt is not taking all the meds on her list - she states she only needs amlodipine , metoprolol , Protonix , Asprin

## 2020-11-17 ENCOUNTER — TELEPHONE (OUTPATIENT)
Dept: NEPHROLOGY | Facility: CLINIC | Age: 80
End: 2020-11-17

## 2020-11-17 NOTE — TELEPHONE ENCOUNTER
Priscilla Ramírez from [facility] called in to advise that patient was evaluated for occupational therapy and is going to be a evaluation only.  Please follow up

## 2020-11-24 ENCOUNTER — PATIENT MESSAGE (OUTPATIENT)
Dept: NEPHROLOGY | Facility: CLINIC | Age: 80
End: 2020-11-24

## 2020-11-24 NOTE — TELEPHONE ENCOUNTER
Paperwork received and partially completed. Spoke with patient's daughter for some information. Dr. Brionna Najera, will review and sign with you in office 11/25/20.

## 2020-11-24 NOTE — TELEPHONE ENCOUNTER
Pts daughter Pro Marcial called to see if paperwork was received from [facility]. Pt is planning to movie in 12/3/20. Please call. Aware [facility] out of office today.

## 2020-12-02 RX ORDER — AMLODIPINE BESYLATE 2.5 MG/1
TABLET ORAL
Qty: 90 TABLET | Refills: 3 | Status: SHIPPED | OUTPATIENT
Start: 2020-12-02 | End: 2020-12-03

## 2020-12-03 RX ORDER — AMLODIPINE BESYLATE 2.5 MG/1
TABLET ORAL
Qty: 90 TABLET | Refills: 3 | Status: SHIPPED | OUTPATIENT
Start: 2020-12-03 | End: 2021-12-20

## 2020-12-03 NOTE — TELEPHONE ENCOUNTER
LOV 7/27/20  No F/U scheduled  Last filled yesterday by Dr. Los Newton for a 1 year supply to [facility]  This is a duplicate request  May refuse.

## 2021-04-07 ENCOUNTER — OFFICE VISIT (OUTPATIENT)
Dept: DERMATOLOGY CLINIC | Facility: CLINIC | Age: 81
End: 2021-04-07
Payer: MEDICARE

## 2021-04-07 DIAGNOSIS — L71.0 PERIORAL DERMATITIS: Primary | ICD-10-CM

## 2021-04-07 PROCEDURE — 99203 OFFICE O/P NEW LOW 30 MIN: CPT | Performed by: PHYSICIAN ASSISTANT

## 2021-04-07 RX ORDER — DOXYCYCLINE HYCLATE 100 MG/1
100 CAPSULE ORAL 2 TIMES DAILY
Qty: 60 CAPSULE | Refills: 3 | Status: SHIPPED | OUTPATIENT
Start: 2021-04-07

## 2021-04-07 RX ORDER — METRONIDAZOLE 7.5 MG/G
1 GEL TOPICAL 2 TIMES DAILY
Qty: 45 G | Refills: 1 | Status: SHIPPED | OUTPATIENT
Start: 2021-04-07

## 2021-04-07 NOTE — PROGRESS NOTES
HPI:    Patient ID: Brad Wolf is a 79year old female. Patient presents for full body exam. She does have a hx of arthritis. She has no lesions of concerns, but does have a rash around her mouth. The rash started about 1 year ago.  And has waxed and TABLET DAILY       Allergies:  Amoxicillin             HIVES   There were no vitals taken for this visit. There is no height or weight on file to calculate BMI. PHYSICAL EXAM:   Physical Exam  Constitutional:       General: She is not in acute distress.

## 2021-06-17 ENCOUNTER — PATIENT MESSAGE (OUTPATIENT)
Dept: NEPHROLOGY | Facility: CLINIC | Age: 81
End: 2021-06-17

## 2021-06-17 DIAGNOSIS — E03.9 HYPOTHYROIDISM, UNSPECIFIED TYPE: ICD-10-CM

## 2021-06-17 DIAGNOSIS — M81.0 AGE-RELATED OSTEOPOROSIS WITHOUT CURRENT PATHOLOGICAL FRACTURE: ICD-10-CM

## 2021-06-17 DIAGNOSIS — Z00.00 ROUTINE ADULT HEALTH MAINTENANCE: Primary | ICD-10-CM

## 2021-06-17 DIAGNOSIS — I10 HTN (HYPERTENSION), BENIGN: ICD-10-CM

## 2021-06-17 DIAGNOSIS — D69.6 THROMBOCYTOPENIA (HCC): ICD-10-CM

## 2021-06-17 DIAGNOSIS — R53.83 FATIGUE, UNSPECIFIED TYPE: ICD-10-CM

## 2021-06-17 NOTE — TELEPHONE ENCOUNTER
From: Kennedy Wong  To: Sruthi Mcmillan MD  Sent: 6/17/2021 2:31 PM CDT  Subject: Non-Urgent Rossy Mcmillan,     My mom is scheduled to see you August 5th. Will you want her to do blood work before the appointment?  If so will she n

## 2021-07-31 ENCOUNTER — LAB ENCOUNTER (OUTPATIENT)
Dept: LAB | Age: 81
End: 2021-07-31
Attending: INTERNAL MEDICINE
Payer: MEDICARE

## 2021-07-31 DIAGNOSIS — D69.6 THROMBOCYTOPENIA (HCC): ICD-10-CM

## 2021-07-31 DIAGNOSIS — I10 HTN (HYPERTENSION), BENIGN: ICD-10-CM

## 2021-07-31 DIAGNOSIS — Z00.00 ROUTINE ADULT HEALTH MAINTENANCE: ICD-10-CM

## 2021-07-31 DIAGNOSIS — E03.9 HYPOTHYROIDISM, UNSPECIFIED TYPE: ICD-10-CM

## 2021-07-31 DIAGNOSIS — M81.0 AGE-RELATED OSTEOPOROSIS WITHOUT CURRENT PATHOLOGICAL FRACTURE: ICD-10-CM

## 2021-07-31 DIAGNOSIS — R53.83 FATIGUE, UNSPECIFIED TYPE: ICD-10-CM

## 2021-07-31 LAB
ALBUMIN SERPL-MCNC: 2.5 G/DL (ref 3.4–5)
ALBUMIN/GLOB SERPL: 0.4 {RATIO} (ref 1–2)
ALP LIVER SERPL-CCNC: 301 U/L
ALT SERPL-CCNC: 16 U/L
ANION GAP SERPL CALC-SCNC: 5 MMOL/L (ref 0–18)
AST SERPL-CCNC: 35 U/L (ref 15–37)
BILIRUB SERPL-MCNC: 0.6 MG/DL (ref 0.1–2)
BILIRUB UR QL: NEGATIVE
BUN BLD-MCNC: 12 MG/DL (ref 7–18)
BUN/CREAT SERPL: 22.6 (ref 10–20)
CALCIUM BLD-MCNC: 8.4 MG/DL (ref 8.5–10.1)
CHLORIDE SERPL-SCNC: 105 MMOL/L (ref 98–112)
CHOLEST SMN-MCNC: 178 MG/DL (ref ?–200)
CO2 SERPL-SCNC: 29 MMOL/L (ref 21–32)
COLOR UR: YELLOW
CREAT BLD-MCNC: 0.53 MG/DL
CRP SERPL-MCNC: 0.93 MG/DL (ref ?–0.3)
GLOBULIN PLAS-MCNC: 6.3 G/DL (ref 2.8–4.4)
GLUCOSE BLD-MCNC: 80 MG/DL (ref 70–99)
GLUCOSE UR-MCNC: NEGATIVE MG/DL
HDLC SERPL-MCNC: 80 MG/DL (ref 40–59)
HGB UR QL STRIP.AUTO: NEGATIVE
KETONES UR-MCNC: NEGATIVE MG/DL
LDLC SERPL CALC-MCNC: 82 MG/DL (ref ?–100)
M PROTEIN MFR SERPL ELPH: 8.8 G/DL (ref 6.4–8.2)
NITRITE UR QL STRIP.AUTO: NEGATIVE
NONHDLC SERPL-MCNC: 98 MG/DL (ref ?–130)
OSMOLALITY SERPL CALC.SUM OF ELEC: 287 MOSM/KG (ref 275–295)
PATIENT FASTING Y/N/NP: YES
PATIENT FASTING Y/N/NP: YES
PH UR: 6 [PH] (ref 5–8)
POTASSIUM SERPL-SCNC: 4.1 MMOL/L (ref 3.5–5.1)
PROT UR-MCNC: 30 MG/DL
SODIUM SERPL-SCNC: 139 MMOL/L (ref 136–145)
SP GR UR STRIP: 1.02 (ref 1–1.03)
TRIGL SERPL-MCNC: 90 MG/DL (ref 30–149)
TSI SER-ACNC: 5.98 MIU/ML (ref 0.36–3.74)
UROBILINOGEN UR STRIP-ACNC: 2
VLDLC SERPL CALC-MCNC: 14 MG/DL (ref 0–30)

## 2021-07-31 PROCEDURE — 85060 BLOOD SMEAR INTERPRETATION: CPT

## 2021-07-31 PROCEDURE — 82306 VITAMIN D 25 HYDROXY: CPT

## 2021-07-31 PROCEDURE — 36415 COLL VENOUS BLD VENIPUNCTURE: CPT

## 2021-07-31 PROCEDURE — 86140 C-REACTIVE PROTEIN: CPT

## 2021-07-31 PROCEDURE — 80061 LIPID PANEL: CPT

## 2021-07-31 PROCEDURE — 80053 COMPREHEN METABOLIC PANEL: CPT

## 2021-07-31 PROCEDURE — 85025 COMPLETE CBC W/AUTO DIFF WBC: CPT

## 2021-07-31 PROCEDURE — 84443 ASSAY THYROID STIM HORMONE: CPT

## 2021-07-31 PROCEDURE — 81001 URINALYSIS AUTO W/SCOPE: CPT

## 2021-08-02 LAB
25(OH)D3 SERPL-MCNC: 43.7 NG/ML (ref 30–100)
BASOPHILS # BLD AUTO: 0.01 X10(3) UL (ref 0–0.2)
BASOPHILS NFR BLD AUTO: 0.6 %
DEPRECATED RDW RBC AUTO: 56.9 FL (ref 35.1–46.3)
EOSINOPHIL # BLD AUTO: 0.01 X10(3) UL (ref 0–0.7)
EOSINOPHIL NFR BLD AUTO: 0.6 %
ERYTHROCYTE [DISTWIDTH] IN BLOOD BY AUTOMATED COUNT: 17.2 % (ref 11–15)
HCT VFR BLD AUTO: 35.1 %
HGB BLD-MCNC: 11.1 G/DL
IMM GRANULOCYTES # BLD AUTO: 0 X10(3) UL (ref 0–1)
IMM GRANULOCYTES NFR BLD: 0 %
LYMPHOCYTES # BLD AUTO: 0.94 X10(3) UL (ref 1–4)
LYMPHOCYTES NFR BLD AUTO: 53.7 %
MCH RBC QN AUTO: 28.5 PG (ref 26–34)
MCHC RBC AUTO-ENTMCNC: 31.6 G/DL (ref 31–37)
MCV RBC AUTO: 90 FL
MONOCYTES # BLD AUTO: 0.2 X10(3) UL (ref 0.1–1)
MONOCYTES NFR BLD AUTO: 11.4 %
NEUTROPHILS # BLD AUTO: 0.59 X10 (3) UL (ref 1.5–7.7)
NEUTROPHILS # BLD AUTO: 0.59 X10(3) UL (ref 1.5–7.7)
NEUTROPHILS NFR BLD AUTO: 33.7 %
PLATELET # BLD AUTO: 115 10(3)UL (ref 150–450)
RBC # BLD AUTO: 3.9 X10(6)UL
WBC # BLD AUTO: 1.8 X10(3) UL (ref 4–11)

## 2021-08-05 ENCOUNTER — OFFICE VISIT (OUTPATIENT)
Dept: NEPHROLOGY | Facility: CLINIC | Age: 81
End: 2021-08-05
Payer: MEDICARE

## 2021-08-05 VITALS
WEIGHT: 83 LBS | SYSTOLIC BLOOD PRESSURE: 145 MMHG | DIASTOLIC BLOOD PRESSURE: 75 MMHG | BODY MASS INDEX: 15.27 KG/M2 | HEIGHT: 62 IN | HEART RATE: 90 BPM

## 2021-08-05 DIAGNOSIS — M06.09 SERONEGATIVE RHEUMATOID ARTHRITIS OF MULTIPLE SITES (HCC): ICD-10-CM

## 2021-08-05 DIAGNOSIS — I10 HTN (HYPERTENSION), BENIGN: Chronic | ICD-10-CM

## 2021-08-05 DIAGNOSIS — D69.6 THROMBOCYTOPENIA (HCC): ICD-10-CM

## 2021-08-05 DIAGNOSIS — I48.0 PAROXYSMAL ATRIAL FIBRILLATION (HCC): ICD-10-CM

## 2021-08-05 DIAGNOSIS — E03.8 HYPOTHYROIDISM DUE TO HASHIMOTO'S THYROIDITIS: ICD-10-CM

## 2021-08-05 DIAGNOSIS — Z12.31 ENCOUNTER FOR SCREENING MAMMOGRAM FOR MALIGNANT NEOPLASM OF BREAST: Primary | ICD-10-CM

## 2021-08-05 DIAGNOSIS — E06.3 HYPOTHYROIDISM DUE TO HASHIMOTO'S THYROIDITIS: ICD-10-CM

## 2021-08-05 DIAGNOSIS — Z00.00 ENCOUNTER FOR ANNUAL HEALTH EXAMINATION: ICD-10-CM

## 2021-08-05 DIAGNOSIS — I49.9 CARDIAC ARRHYTHMIA, UNSPECIFIED CARDIAC ARRHYTHMIA TYPE: ICD-10-CM

## 2021-08-05 PROCEDURE — 90732 PPSV23 VACC 2 YRS+ SUBQ/IM: CPT | Performed by: INTERNAL MEDICINE

## 2021-08-05 PROCEDURE — G0439 PPPS, SUBSEQ VISIT: HCPCS | Performed by: INTERNAL MEDICINE

## 2021-08-05 PROCEDURE — G0009 ADMIN PNEUMOCOCCAL VACCINE: HCPCS | Performed by: INTERNAL MEDICINE

## 2021-08-05 RX ORDER — LEVOTHYROXINE SODIUM 0.03 MG/1
25 TABLET ORAL
Qty: 90 TABLET | Refills: 1 | Status: SHIPPED | OUTPATIENT
Start: 2021-08-05 | End: 2021-10-28

## 2021-08-05 NOTE — PROGRESS NOTES
HPI:   Candice Carrillo is a 79year old female who presents for a Medicare Subsequent Annual Wellness visit (Pt already had Initial Annual Wellness).     She  Is accompanied by her daughter Soren Hernandez  Patient is doing well now an assisted living which I had re office a copy of it for scanning into Epic. She does NOT have a Power of  for [employer] on file in [place].    The patient has this document but we do not have it in Epic, and patient is instructed to get our office a copy of it for scanning into Lab Results   Component Value Date    AST 35 07/31/2021    ALT 16 07/31/2021    CA 8.4 (L) 07/31/2021    ALB 2.5 (L) 07/31/2021    TSH 5.980 (H) 07/31/2021    CREATSERUM 0.53 (L) 07/31/2021    GLU 80 07/31/2021        CBC  (most recent labs)   Lab Result past surgical history that includes foot surgery; cholecystectomy (2012); electrocardiogram, complete (08-); mastectomy right (2009); colonoscopy (2013); and colonoscopy (N/A, 3/29/2017).     Her family history includes Breast Cancer (age of onset: 8 cannot hear well and fear I will reply improperly: No   Family members and friends have told me they think I may have hearing loss:  No               Visual Acuity                           General appearance: alert, appears stated age and cooperative   Con rhythm controlled rate on Toprol    50 twice daily    #5 hypertension is on Toprol and amlodipine 2.5/day  Pressure at home less than 140/80        Have ordered a mammogram see her back in November we will get labs prior to that    #6 hypothyroidism start beneficiaries without apparent signs or symptoms of cardiovascular disease Lab Results   Component Value Date    CHOLEST 178 07/31/2021    HDL 80 (H) 07/31/2021    LDL 82 07/31/2021    TRIG 90 07/31/2021         Electrocardiogram (EKG)   Covered if needed 03/11/2017    Hepatitis B One screening covered for patients with certain risk factors   -  No recommendations at this time    Tetanus Toxoid Not covered by Medicare Part B unless medically necessary (cut with metal); may be covered with your pharmacy pres

## 2021-08-05 NOTE — PATIENT INSTRUCTIONS
Please start thyroid medicine your thyroid is underactive    Repeat labs in 10 weeks  They are in the computer      Do mammogram in October through December if you can the orders are in the computer    Same medications      Your protein level is low in you Colonoscopy   Covered every 10 years    Covered every 2 years if patient is at high risk or previous colonoscopy was abnormal 03/29/2017    No recommendations at this time    Flexible Sigmoidoscopy   Covered every 4 years -    Fecal Occult Blood Test Cover Directives    [employer]. org/publications/Documents/personal_dec. pdf  An information packet, including necessary form from the [employer] website. http://www. [place]state. [place]/public/books/advin.htm  A link to the [place]

## 2021-08-06 ENCOUNTER — TELEPHONE (OUTPATIENT)
Dept: NEPHROLOGY | Facility: CLINIC | Age: 81
End: 2021-08-06

## 2021-08-06 NOTE — TELEPHONE ENCOUNTER
Spoke with patient, discussed below message. Pt confirmed she goes to [facility] location. Clearance faxed.

## 2021-08-06 NOTE — TELEPHONE ENCOUNTER
Left message to call back to confirm what location her dentist is at [facility] with [facility]) in order to fax over medical clearance for dental treatment per Dr. Ella Messina. My chart message sent to patient as well.

## 2021-10-12 RX ORDER — METOPROLOL TARTRATE 50 MG/1
TABLET, FILM COATED ORAL
Qty: 180 TABLET | Refills: 3 | Status: SHIPPED | OUTPATIENT
Start: 2021-10-12

## 2021-10-28 ENCOUNTER — TELEPHONE (OUTPATIENT)
Dept: CARDIOLOGY | Age: 81
End: 2021-10-28

## 2021-10-28 RX ORDER — LEVOTHYROXINE SODIUM 0.03 MG/1
25 TABLET ORAL
Qty: 90 TABLET | Refills: 0 | Status: SHIPPED | OUTPATIENT
Start: 2021-10-28

## 2021-11-11 ENCOUNTER — TELEPHONE (OUTPATIENT)
Dept: NEPHROLOGY | Facility: CLINIC | Age: 81
End: 2021-11-11

## 2021-11-11 NOTE — TELEPHONE ENCOUNTER
Celina from Dr. Mary Grace Waggoner office called in requesting to speak directly to Rn she did not go into details. Attempting to call Rn now.  Please call

## 2021-12-07 ENCOUNTER — LAB REQUISITION (OUTPATIENT)
Dept: LAB | Age: 81
End: 2021-12-07

## 2021-12-07 ENCOUNTER — LAB SERVICES (OUTPATIENT)
Dept: LAB | Age: 81
End: 2021-12-07

## 2021-12-07 DIAGNOSIS — M06.9 RHEUMATOID ARTHRITIS, UNSPECIFIED (CMD): ICD-10-CM

## 2021-12-07 DIAGNOSIS — M81.0 AGE-RELATED OSTEOPOROSIS WITHOUT CURRENT PATHOLOGICAL FRACTURE: ICD-10-CM

## 2021-12-07 PROCEDURE — 80053 COMPREHEN METABOLIC PANEL: CPT | Performed by: CLINICAL MEDICAL LABORATORY

## 2021-12-07 PROCEDURE — 82306 VITAMIN D 25 HYDROXY: CPT | Performed by: CLINICAL MEDICAL LABORATORY

## 2021-12-08 LAB
25(OH)D3+25(OH)D2 SERPL-MCNC: 44.7 NG/ML (ref 30–100)
ALBUMIN SERPL-MCNC: 2.6 G/DL (ref 3.6–5.1)
ALBUMIN/GLOB SERPL: 0.4 {RATIO} (ref 1–2.4)
ALP SERPL-CCNC: 232 UNITS/L (ref 45–117)
ALT SERPL-CCNC: 13 UNITS/L
ANION GAP SERPL CALC-SCNC: 13 MMOL/L (ref 10–20)
AST SERPL-CCNC: 27 UNITS/L
BILIRUB SERPL-MCNC: 0.5 MG/DL (ref 0.2–1)
BUN SERPL-MCNC: 10 MG/DL (ref 6–20)
BUN/CREAT SERPL: 19 (ref 7–25)
CALCIUM SERPL-MCNC: 8.9 MG/DL (ref 8.4–10.2)
CHLORIDE SERPL-SCNC: 105 MMOL/L (ref 98–107)
CO2 SERPL-SCNC: 26 MMOL/L (ref 21–32)
CREAT SERPL-MCNC: 0.52 MG/DL (ref 0.51–0.95)
FASTING DURATION TIME PATIENT: ABNORMAL H
GFR SERPLBLD BASED ON 1.73 SQ M-ARVRAT: 89 ML/MIN
GLOBULIN SER-MCNC: 6.5 G/DL (ref 2–4)
GLUCOSE SERPL-MCNC: 88 MG/DL (ref 70–99)
POTASSIUM SERPL-SCNC: 4.2 MMOL/L (ref 3.4–5.1)
PROT SERPL-MCNC: 9.1 G/DL (ref 6.4–8.2)
SODIUM SERPL-SCNC: 140 MMOL/L (ref 135–145)

## 2021-12-20 RX ORDER — AMLODIPINE BESYLATE 2.5 MG/1
TABLET ORAL
Qty: 90 TABLET | Refills: 0 | Status: SHIPPED | OUTPATIENT
Start: 2021-12-20

## 2021-12-29 RX ORDER — LEVOTHYROXINE SODIUM 0.03 MG/1
TABLET ORAL
Qty: 90 TABLET | Refills: 3 | OUTPATIENT
Start: 2021-12-29

## 2022-03-04 RX ORDER — AMLODIPINE BESYLATE 2.5 MG/1
TABLET ORAL
Qty: 30 TABLET | Refills: 0 | Status: SHIPPED | OUTPATIENT
Start: 2022-03-04

## 2022-04-18 RX ORDER — AMLODIPINE BESYLATE 2.5 MG/1
TABLET ORAL
Qty: 30 TABLET | Refills: 11 | Status: SHIPPED | OUTPATIENT
Start: 2022-04-18

## 2022-06-14 ENCOUNTER — LAB SERVICES (OUTPATIENT)
Dept: LAB | Age: 82
End: 2022-06-14

## 2022-06-14 ENCOUNTER — LAB REQUISITION (OUTPATIENT)
Dept: LAB | Age: 82
End: 2022-06-14

## 2022-06-14 DIAGNOSIS — M81.0 AGE-RELATED OSTEOPOROSIS WITHOUT CURRENT PATHOLOGICAL FRACTURE: ICD-10-CM

## 2022-06-14 PROCEDURE — 80048 BASIC METABOLIC PNL TOTAL CA: CPT | Performed by: CLINICAL MEDICAL LABORATORY

## 2022-06-14 PROCEDURE — 82306 VITAMIN D 25 HYDROXY: CPT | Performed by: CLINICAL MEDICAL LABORATORY

## 2022-06-15 LAB
25(OH)D3+25(OH)D2 SERPL-MCNC: 38.7 NG/ML (ref 30–100)
ANION GAP SERPL CALC-SCNC: 9 MMOL/L (ref 7–19)
BUN SERPL-MCNC: 12 MG/DL (ref 6–20)
BUN/CREAT SERPL: 19 (ref 7–25)
CALCIUM SERPL-MCNC: 8.7 MG/DL (ref 8.4–10.2)
CHLORIDE SERPL-SCNC: 109 MMOL/L (ref 97–110)
CO2 SERPL-SCNC: 28 MMOL/L (ref 21–32)
CREAT SERPL-MCNC: 0.63 MG/DL (ref 0.51–0.95)
FASTING DURATION TIME PATIENT: NORMAL H
GFR SERPLBLD BASED ON 1.73 SQ M-ARVRAT: 88 ML/MIN
GLUCOSE SERPL-MCNC: 93 MG/DL (ref 70–99)
POTASSIUM SERPL-SCNC: 3.6 MMOL/L (ref 3.4–5.1)
SODIUM SERPL-SCNC: 142 MMOL/L (ref 135–145)

## 2022-07-21 RX ORDER — METOPROLOL TARTRATE 50 MG/1
TABLET, FILM COATED ORAL
Qty: 180 TABLET | Refills: 3 | OUTPATIENT
Start: 2022-07-21

## 2022-10-28 RX ORDER — METOPROLOL TARTRATE 50 MG/1
TABLET, FILM COATED ORAL
Qty: 180 TABLET | Refills: 3 | OUTPATIENT
Start: 2022-10-28

## 2022-11-02 ENCOUNTER — TELEPHONE (OUTPATIENT)
Dept: INTERNAL MEDICINE CLINIC | Facility: CLINIC | Age: 82
End: 2022-11-02

## (undated) DEVICE — TRAP 4 CPTR CHMBR N EZ INLN

## (undated) DEVICE — CLIP RESOLUTION 235CM

## (undated) DEVICE — Device: Brand: DEFENDO AIR/WATER/SUCTION AND BIOPSY VALVE

## (undated) DEVICE — REM POLYHESIVE ADULT PATIENT RETURN ELECTRODE: Brand: VALLEYLAB

## (undated) DEVICE — ENDOSCOPY PACK - LOWER: Brand: MEDLINE INDUSTRIES, INC.

## (undated) DEVICE — SNARE CAPTIFLEX MICRO-OVL OLY

## (undated) DEVICE — NEEDLE CONTRAST INTERJECT 25G

## (undated) NOTE — MR AVS SNAPSHOT
Vikram Mcmahon [address] [place] [address] [phone]  [phone]               Thank you for choosing us for your health care visit with Frank Hernandez NP.   We are glad to serve you and happy to provide yo · Compare your home medications with the medication list attached, call with questions or there are any differences    · Heart healthy, decreased refined sugars, and  2000 mg sodium restricted diet and maintain fluids at 48 to 64 ounces per day.  Common h Commonly known as:  ANASPAZ,LEVSIN           LUTEIN OR   Take 6 mg by mouth daily. magnesium oxide 400 MG Tabs   Take 1 tablet (400 mg total) by mouth 2 (two) times daily.    What changed:  when to take this   Commonly known as:  MAG-OX patients and it is not recommended for use with pregnant women. CBC WITH DIFFERENTIAL WITH PLATELET           CBC W/ DIFFERENTIAL      Component Value Standard Range & Units    WBC 4.3 4.0-11.0 K/UL    RBC 3.74 3.70-5.40 M/UL    HGB 10.6 12.

## (undated) NOTE — IP AVS SNAPSHOT
[address]  [address] [facility] [phone]                Discharge Summary   5/15/2017    Aaron Vaca           Admission Information        Provider Department    5/15/2017 Boris Bean MD [facility] 5 Instructions Authorizing Provider    Morning Afternoon Evening As Needed    Metoprolol Tartrate 100 MG Tabs   Last time this was given:  100 mg on 5/19/2017  9:04 AM   Commonly known as:  LOPRESSOR   What changed:    - medication strength  - how much to t Take 325 mg by mouth every 6 (six) hours as needed for Pain.                            vitamin B-12 50 MCG Tabs   Commonly known as:  CYANOCOBALAMIN   Next dose due:  5/20/17 in am        Take 500 mcg by mouth daily.                             Vitamin C CBC WITH DIFFERENTIAL WITH PLATELET  0/28/8380 (Approximate) 5/19/2018    Questions:      Spec comment:        Immunization History as of 5/19/2017  Never Reviewed    INFLUENZA 1/14/2016, 11/28/2007, 11/9/2000    Pneumococcal (Prevnar 13) 3/11/2016    TD 11 (L) (03/06/17)  0.9 (03/06/17)  7.7 (05/19/17)  2.6 (L) (05/19/17)  132 (L) (05/19/17)  4.5 (05/19/17)  96      Pending Labs     Order Current Status    BLOOD CULTURE Preliminary result    BLOOD CULTURE Preliminary result      Radiology Exams     None _____________________________________________________________________________    Medication Side Effects - Medications to be taken at home  As your caregivers, we want you to be aware of the medications you are prescribed to take and their potential SIDE E clots, high blood pressure   What to report to your healthcare team: Pain, swelling, rash, fever           Non-Narcotic Pain Medications     acetaminophen (TYLENOL) 325 MG Oral Tab       Use: Treat pain, fever, inflammation   Most common side effects: Stom

## (undated) NOTE — LETTER
Hospital Discharge Documentation  Please phone to schedule a hospital follow up appointment.     From: [address] Hospitalist's Office  Phone: [phone]    Patient discharged time/date: 5/19/2017  1:19 PM  Patient discharge disposition:  Home or Self

## (undated) NOTE — LETTER
[facility], [place]  Authorization for Invasive Procedures  1.  I hereby authorize Dr. Kell Ayala , my physician and whomever may be designated as the doctor's assistant, to perform the following operation and/or procedure:  Alyssa Kenney performed for the purposes of advancing medicine, science, and/or education, provided my identity is not revealed. If the procedure has been videotaped, the physician/surgeon will obtain the original videotape.  The hospital will not be responsible for stor My signature below affirms that prior to the time of the procedure, I have explained to the patient and/or her legal representative, the risks and benefits involved in the proposed treatment and any reasonable alternative to the proposed treatment.  I have

## (undated) NOTE — MR AVS SNAPSHOT
[facility]  [address] [facility] [place] [id number] [phone]               Thank you for choosing us for your health care visit with Elana Edgar. Harvey Geller MD.  We are glad to serve you and happy to provide you with this summary of your visit. Generic drug:  acetaminophen   Take 325 mg by mouth every 6 (six) hours as needed for Pain.           vitamin B-12 50 MCG Tabs   Take 500 mcg by mouth daily. Commonly known as:  CYANOCOBALAMIN           Vitamin C 500 MG Tabs   Take 500 mg by mouth daily.

## (undated) NOTE — MR AVS SNAPSHOT
[facility]  [address] [place] [address], [address] [place] [address]               Thank you for choosing us for your health care visit with Jena Burgos MD.  We are glad to serve you and happy to provide you with this summary Assoc Dx: Sjogren's syndrome with keratoconjunctivitis sicca (HCC) [M35.01]           CH50 Complement    Complete by:  Sep 07, 2017 (Approximate)    Assoc Dx:   Sjogren's syndrome with keratoconjunctivitis sicca ([facility] [address] [M35.01]                 Follow-up In Commonly known as:  CYANOCOBALAMIN           Vitamin C 500 MG Tabs   Take 500 mg by mouth daily. Commonly known as:  VITAMIN C           Vitamin D3 2000 units Tabs   Take 2,000 Int'l Units by mouth daily.            vitamin E 400 UNITS Caps   Take 400 Uni Tips for making healthy food choices  -   Enjoy your food, but eat less. Fully enjoy your food when eating. Don’t eat while distracted and slow down. Avoid over sized portions. Don’t eat while when you’re bored.      EAT THESE FOODS MORE OFTEN: EAT T

## (undated) NOTE — IP AVS SNAPSHOT
[facility]    [address], [place], [place] ~ [phone]                Discharge Summary   3/29/2017    Stuart Ray           Admission Information        Provider Department    3/29/2017 Shoshana Alves MD [facility] E 4.63 (03/06/17)  13.1 (03/06/17)  39.4 (03/06/17)  85.0 -- -- -- (03/06/17)  117 (L) (03/06/17)  8.3      Recent Hematology Lab Results (cont.)  (Last 3 results in the past 90 days)    Neutrophil % Lymphocyte % Monocyte % Eosinophil % Basophil % Prelim Joaquin We want to hear from you       We want to hear from you, please share your experience with us by returning the survey you will receive in the mail. Thank you!         MyChart     Visit [facility]  You can access your MyChart to more actively manage your healt

## (undated) NOTE — MR AVS SNAPSHOT
[facility]  [address] [place] [id number] [phone]               Thank you for choosing us for your health care visit with Jemal Banegas. Tracy Mccallum MD.  We are glad to serve you and happy to provide you with this summary of your visit. folic acid 915 MCG Tabs   Take 400 mcg by mouth daily. Commonly known as:  FOLVITE           Hydroxychloroquine Sulfate 200 MG Tabs   Take 1 tablet (200 mg total) by mouth 2 (two) times daily.    Commonly known as:  PLAQUENIL           Hyoscyamine Sulfat

## (undated) NOTE — ED AVS SNAPSHOT
Oscar Kelley   MRN: [id number]    Department:  [facility] Emergency Department   Date of Visit:  5/24/2019           Disclosure     Insurance plans vary and the physician(s) referred by the ER may not be covered by your plan.  Please contact y within the next three months to obtain basic health screening including reassessment of your blood pressure.     IF THERE IS ANY CHANGE OR WORSENING OF YOUR CONDITION, CALL YOUR PRIMARY CARE PHYSICIAN AT ONCE OR RETURN IMMEDIATELY TO THE EMERGENCY DEPARTMEN

## (undated) NOTE — MR AVS SNAPSHOT
Vikram Mcmahon [address] [place] [address] [phone]               Thank you for choosing us for your health care visit with Deepa Oliva NP.   We are glad to serve you and happy to provide yo Weigh yourself daily in the morning before breakfast, call if gaining 3 lbs or more overnight or more than 5 lbs in one week.     Follow up with Dr. Jhony Lares in 1-2 weeks, call to make an appointment    Follow up with Dr. Ailyn Reyes in 1 month    Follow up with edna Take 1 tablet by mouth nightly. DAILY MULTIVITAMIN Tabs   1 TABLET DAILY           Flecainide Acetate 100 MG Tabs   Take 1 tablet (100 mg total) by mouth every 12 (twelve) hours.    Commonly known as:  TAMBOCOR           folic acid 162 MCG Tabs Potassium 4.5 3.3-5.1 mmol/L    Chloride 92  mmol/L    CO2 25 22-32 mmol/L    BUN 10 8-20 mg/dL    Creatinine 0.58 0.50-1.50 mg/dL    Calcium, Total 8.7 8.5-10.5 mg/dL    BUN/CREA Ratio 17.2 10.0-20.0    Anion Gap 10 0-18 mmol/L    Calculated [place]

## (undated) NOTE — MR AVS SNAPSHOT
[facility]  [address] [place] [address] [phone]               Thank you for choosing us for your health care visit with Flaco Jerome MD.  We are glad to serve you and happy to provide you with this summary of your vis Take 1 tablet (200 mg total) by mouth 2 (two) times daily. Commonly known as:  PLAQUENIL           Hyoscyamine Sulfate 0.125 MG Tabs   Take 1 tablet by mouth every 6 (six) hours as needed for Cramping.    Commonly known as:  Cecile Murray DASH eating plan Adopt a diet rich in fruits, vegetables, and low fat dairy products with reduced content of saturated and total fat.    Dietary sodium reduction Reduce dietary sodium intake to <= 100 mmol per day (2.4 g sodium or 6 g sodium chloride)   Aer

## (undated) NOTE — LETTER
[address], [place]  Authorization for Invasive Procedures  1.  I hereby authorize Dr. Kyle Goyal , my physician and whomever may be designated as the doctor's assistant, to perform the following operation and/or procedure:  Shayy 5. I consent to the photographing of the operations or procedures to be performed for the purposes of advancing medicine, science, and/or education, provided my identity is not revealed.  If the procedure has been videotaped, the physician/surgeon will obta __________ Time: ___________    Statement of Physician  My signature below affirms that prior to the time of the procedure, I have explained to the patient and/or her legal representative, the risks and benefits involved in the proposed treatment and any r